# Patient Record
Sex: FEMALE | Race: BLACK OR AFRICAN AMERICAN
[De-identification: names, ages, dates, MRNs, and addresses within clinical notes are randomized per-mention and may not be internally consistent; named-entity substitution may affect disease eponyms.]

---

## 2017-02-23 NOTE — RAD
PA AND LATERAL VIEWS OF CHEST:

 

Date:

02/23/17 

 

HISTORY:  

Dyspnea, cough. 

 

FINDINGS:

 

Comparison made with exam of 09/25/16. 

 

The heart size is normal. The lungs are well expanded without focal areas of consolidation, pneumoth
orax, or pleural effusions. There are degenerative changes in the spine. 

 

IMPRESSION: 

No radiographic evidence of acute cardiopulmonary process. 

 

 

POS: [facility]

## 2017-02-28 NOTE — CT
ABDOMEN AND PELVIC CT SCAN WITH IV CONTRAST:

2/28/17

 

HISTORY: 

41-year-old female with cough and bodyaches. History of diverticulitis, patient did have some nausea
 and vomiting at the time of injection.

 

Postcontrast imaging of the abdomen and pelvis is performed. There is slight delay in the initiating
 the scan because of the patient's nausea and vomiting at the time of injection resulting in dense c
ontrast opacification of the renal collecting systems and ureters and bladder. 

 

The visualized liver, gallbladder, pancreas, spleen, adrenal glands are unremarkable. Nonobstructing
 renal calculi cannot be excluded because of the dense contrast. There is no evidence for obstructin
g  calculus. Normal appearing appendix. Unremarkable uterus and adnexa. No abscess or abnormal flu
id collection. 

 

IMPRESSION:  

Unremarkable abdomen and pelvic CT scan. 

 

POS: [facility]

## 2017-02-28 NOTE — RAD
PA AND LATERAL CHEST:

 

Comparison:  2-23-17

 

History:  Cough, body aches. 

 

FINDINGS: 

Heart size appears borderline to minimally enlarged.  The mediastinum appears unremarkable.  The michelle
gs are clear of infiltrates. 

 

IMPRESSION: 

Borderline heart size. 

 

POS: [facility]

## 2017-06-05 NOTE — RAD
TWO VIEWS OF THE CHEST:

 

COMPARISON: 

2/28/17.

 

HISTORY: 

Cough for 2 weeks.

 

FINDINGS:

Two views of the chest show normal sized cardiomediastinal silhouette. There is no evidence of conso
lidation, mass, or pleural effusion. Degenerative changes are seen in the spine.

 

IMPRESSION:

No evidence of acute cardiopulmonary disease.

 

POS: [facility]

## 2018-05-13 NOTE — CT
NONCONTRAST CT HEAD:

 

Date:  05/13/18 

 

HISTORY:  

Headache. 

 

COMPARISON:  

05/16/16. 

 

FINDINGS:

There is no evidence of a hemorrhage, acute infarction, mass effect, or midline shift. Ventricular sy
stem is stable in size and appearance compared to prior exam. There has been no interval change from 
prior study. 

 

IMPRESSION: 

No acute intracranial abnormality is demonstrated.  

 

POS: [facility]

## 2018-06-18 NOTE — RAD
PORTABLE AP CHEST X-RAY:

 

06/18/2018

 

HISTORY:

Difficulty breathing and chest tightness.  Symptoms started this morning and are intermittent.

 

COMPARISON:

09/26/2017

 

FINDINGS:

The cardiac silhouette and the bronchovascular markings are accentuated by the shallow depth of inspi
ration and the portable technique.  The lungs remain clear.  There has been no interval change from t
he prior exam.

 

IMPRESSION:

No acute cardiopulmonary process.

 

POS: [facility]

## 2018-07-02 NOTE — RAD
RADIOGRAPH OF CHEST FRONTAL VIEW:

 

Comparison: 6-18-18

 

Indication: Chest pain. 

 

FINDINGS: 

Cardiac silhouette is accentuated with portable technique. No lobar consolidation, effusion, or discr
ete pneumothorax. Overlying body wall soft tissues do limit sensitivity of the exam. 

 

IMPRESSION: 

No focal consolidation. 

 

POS: [facility]

## 2018-07-26 NOTE — RAD
CHEST TWO VIEW

7/26/18

 

HISTORY: 

Lupus, flu-like symptoms. 

 

COMPARISON:  

Radiographs 2017.

 

FINDINGS:  

The lungs are clear. No pneumothorax or effusion. The cardiac silhouette and mediastinal contours are
 within normal limits.

 

IMPRESSION:  

No acute intrathoracic abnormality. 

 

[facility]: [facility]

## 2018-07-30 NOTE — PDOC.PN
- Subjective


Encounter Start Date: 07/30/18


Encounter Start Time: 11:45


Subjective: nsg notes rev, nany ovn, did not sleep well ovn, still has pain in 

mouth


-: when trying to eat, overall still has fatigue, mm aches, joint aches and 


-: abd pain more prominent 





- Objective


Resuscitation Status: 


 











Resuscitation Status           FULL:Full Resuscitation














Vital Signs & Weight: 


 Vital Signs (12 hours)











  Temp Pulse Resp BP Pulse Ox


 


 07/30/18 08:40  97.8 F  57 L  20   96


 


 07/30/18 07:45  97.8 F  57 L  20  129/82  96











I&O: 


 











 07/29/18 07/30/18 07/31/18





 06:59 06:59 06:59


 


Intake Total  1550 


 


Balance  1550 











Result Diagrams: 


 07/30/18 05:27





 07/30/18 05:27


Additional Labs: 


 Accuchecks











  07/30/18 07/30/18 07/29/18





  10:43 05:37 19:59


 


POC Glucose  209 H  191 H  201 H














Phys Exam





- Physical Examination


Constitutional: NAD


lying in hospital bed


moist mm, poor dentition, ulcer on palate unchanged


Respiratory: no wheezing, no rales, no rhonchi, clear to auscultation bilateral


Cardiovascular: RRR, no significant murmur, no rub


soft heart tones


Gastrointestinal: soft, no distention, positive bowel sounds


ttp epigastrium and RLQ particularly


Musculoskeletal: pulses present


trace b/l pitting pedal edema


Neurological: moves all 4 limbs


Psychiatric: normal affect, A&O x 3





Dx/Plan





- Plan





1.  Patient does have a known history of lupus.  She is having signs and 

symptoms consistent with a potential lupus flare.  Continue solumedrol, 

symptomatic mgmt. Already on cellcept and plaquenil. Question of whether or not 

ther eis also GI involvement at this point in time. pending dsDNA Ab





progressive leukopenia


chronic steroid use + cellcept + plaquenil, pt with degree of immunosuppression 


empiric cipro and flagyl with most likely etiology abdominal 





2.  Insulin-dependent type 2 diabetes.  We will continue the patient on her 

home regimen with serial glucose checks and sliding scale insulin given the 

anticipated hyperglycemia secondary to IV steroid utilization.





3.  Hypertension, currently appears to be stable.patient without any listed 

home antihypertensives. will continue to monitor





4.  Diet as tolerated. IVF dec from 100cc/hr to 75 cc/hr 2/2 edema. Pt still 

having poor Po intake 2/2 oral ulcers


5.  Deep venous thrombosis prophylaxis with Lovenox.  Activity as tolerated.








Review of Systems





- Medications/Allergies


Allergies/Adverse Reactions: 


 Allergies











Allergy/AdvReac Type Severity Reaction Status Date / Time


 


tramadol Allergy   Verified 09/26/16 06:05











Medications: 


 Current Medications





Acetaminophen (Tylenol)  650 mg PO Q4H PRN


   PRN Reason: Headache/Fever or Mild Pain


   Last Admin: 07/30/18 10:53 Dose:  650 mg


Alogliptin Benzoate (Alogliptin)  25 mg PO DAILY [facility]


   Last Admin: 07/30/18 08:43 Dose:  25 mg


Bisacodyl (Dulcolax)  10 mg PO DAILYPRN PRN


   PRN Reason: Constipation


Al Hydroxide/Mg Hydroxide 60 ml/ Lidocaine HCl 30 ml/Diphenhydramine HCl 75 mg  

0 ml SSW PRN PRN


   PRN Reason: Mouth Irritation


   Last Admin: 07/30/18 06:18 Dose:  10 ml


Dextrose/Water (Dextrose 50%)  25 gm SLOW IVP PRN PRN


   PRN Reason: Hypoglycemia


Enoxaparin Sodium (Lovenox)  40 mg SC 0900 [facility]


   Last Admin: 07/30/18 08:43 Dose:  40 mg


Famotidine (Pepcid)  20 mg SLOW IVP Q12HR [facility]


   Last Admin: 07/30/18 06:19 Dose:  20 mg


Glipizide (Glucotrol)  5 mg PO BID [facility]


   Last Admin: 07/30/18 08:43 Dose:  5 mg


Glucagon (Glucagon)  1 mg IM PRN PRN


   PRN Reason: Hypoglycemia


Hydroxychloroquine Sulfate (Plaquenil)  200 mg PO DAILY [facility]


   Last Admin: 07/30/18 08:43 Dose:  200 mg


Dextrose/Water (D5w)  1,000 mls @ 0 mls/hr IV .Q0M PRN; As Directed


   PRN Reason: Hypoglycemia


Insulin Glargine 15 units/ (Miscellaneous Medication)  0.15 mls @ 0 mls/hr SC 

BID [facility]


   Last Admin: 07/30/18 08:43 Dose:  0.15 mls


Ciprofloxacin/Dextrose 400 mg/ (Device)  200 mls @ 200 mls/hr IVPB Q12HR [facility]


Metronidazole 500 mg/ Device  100 mls @ 100 mls/hr IVPB Q8HR [facility]


Sodium Chloride (Normal Saline 0.9%)  1,000 mls @ 75 mls/hr IV .[id number] [facility]


Insulin Human Lispro (Humalog)  0 units SC .MILD SLIDING SCALE PRN


   PRN Reason: Mild Correctional Scale


   Last Admin: 07/30/18 08:43 Dose:  2 unit


Methylprednisolone Sodium Succinate (Solu-Medrol)  40 mg IVP Q6HR [facility]


   Last Admin: 07/30/18 05:13 Dose:  40 mg


Mycophenolate Mofetil (Cellcept)  1,500 mg PO BID [facility]


   Last Admin: 07/30/18 09:38 Dose:  1,500 mg


Senna (Senokot)  2 tab PO HSPRN PRN


   PRN Reason: Constipation


Sodium Chloride (Flush - Normal Saline)  10 ml IVF Q12HR [facility]


   Last Admin: 07/30/18 08:47 Dose:  Not Given


Sodium Chloride (Flush - Normal Saline)  10 ml IVF PRN PRN


   PRN Reason: Saline Flush

## 2018-07-31 NOTE — RAD
ABDOMEN TWO VIEWS:

 

History: Abdominal pain. 

 

FINDINGS: 

Gas and stool are apparent throughout the colon and rectum. No differential airfluid levels or eviden
ce of free subdiaphragmatic gas. Mild dilated gas filled loops of small bowel are present within the 
mid and left abdomen. No radiopaque foreign bodies. Phleboliths project over the pelvis. Degenerative
 changes lower lumbar spine and hips. 

 

IMPRESSION: 

Nonspecific bowel gas pattern. 

 

POS: [facility]

## 2018-07-31 NOTE — PDOC.PN
- Subjective


Encounter Start Date: 07/31/18


Encounter Start Time: 09:40





Pt seen for followup re: lupus flare.  Complains of abdo pain, diffuse, mild.





- Objective


Resuscitation Status: 


 











Resuscitation Status           FULL:Full Resuscitation














MAR Reviewed: Yes


Vital Signs & Weight: 


 Vital Signs (12 hours)











  Temp Pulse Resp BP Pulse Ox


 


 07/31/18 11:57  98.0 F  57 L  18  133/84  97


 


 07/31/18 08:00  98.0 F  96  18   98


 


 07/31/18 07:53  98.0 F  96  18  132/83  98








 Weight











Admit Weight                   231 lb 7.766 oz


 


Weight                         231 lb 7.766 oz














I&O: 


 











 07/30/18 07/31/18 08/01/18





 06:59 06:59 06:59


 


Intake Total 1550 2260 


 


Balance 1550 2260 











Result Diagrams: 


 07/31/18 04:43





 07/31/18 04:43


Additional Labs: 


 Accuchecks











  07/31/18 07/31/18 07/30/18





  11:36 04:24 19:23


 


POC Glucose  132 H  181 H  115 H














  07/30/18





  17:07


 


POC Glucose  107








labs reviewed by me





Phys Exam





- Physical Examination


Morbid obesity


HEENT: moist MMs, sclera anicteric, 2+ tonsils


mucocutaneous lesions


Neck: no nodes, no JVD, supple, full ROM


Respiratory: no wheezing, no rales, no rhonchi, clear to auscultation bilateral


Cardiovascular: RRR, no rub


S1, S2


Gastrointestinal: soft, non-tender, no distention, positive bowel sounds


Neurological: moves all 4 limbs


Psychiatric: normal affect, A&O x 3


Deviation from normal: lesions over extremities





Dx/Plan


(1) SLE exacerbation


Code(s): M32.9 - SYSTEMIC LUPUS ERYTHEMATOSUS, UNSPECIFIED   Status: Acute   

Comment: continue IV steroids, cellcept and plaquenil.   





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


Code(s): E13.9 - OTHER SPECIFIED DIABETES MELLITUS WITHOUT COMPLICATIONS   

Status: Chronic   Comment: continue accuchecks, insulin sliding scale   





(3) Diastolic dysfunction


Code(s): I51.9 - HEART DISEASE, UNSPECIFIED   Status: Chronic   Comment: stable

   





(4) Obesity, Class III, BMI 40-49.9 (morbid obesity)


Code(s): E66.01 - MORBID (SEVERE) OBESITY DUE TO EXCESS CALORIES   Status: 

Chronic   Comment: for management as outpatient   





- Plan





* .








Review of Systems





- Review of Systems


Constitutional: weakness.  negative: fever, chills, sweats, malaise


Respiratory: negative: Cough, Shortness of Breath, SOB with Excertion, 

Pleuritic Pain, Wheezing


Cardiovascular: negative: chest pain, palpitations, orthopnea, paroxysmal 

nocturnal dyspnea, edema, light headedness, other


Gastrointestinal: Abdominal Pain.  negative: Nausea, Vomiting, Diarrhea, 

Constipation, Melena, Hematochezia


Genitourinary: negative: Dysuria, Frequency, Incontinence, Hematuria, Retention


Skin: Rash, Lesions.  negative: Tanvir, Bruising





- Medications/Allergies


Allergies/Adverse Reactions: 


 Allergies











Allergy/AdvReac Type Severity Reaction Status Date / Time


 


tramadol Allergy   Verified 09/26/16 06:05











Medications: 


 Current Medications





Acetaminophen (Tylenol)  650 mg PO Q4H PRN


   PRN Reason: Headache/Fever or Mild Pain


   Last Admin: 07/31/18 09:07 Dose:  650 mg


Alogliptin Benzoate (Alogliptin)  25 mg PO DAILY [facility]


   Last Admin: 07/31/18 09:07 Dose:  25 mg


Bisacodyl (Dulcolax)  10 mg PO DAILYPRN PRN


   PRN Reason: Constipation


Al Hydroxide/Mg Hydroxide 60 ml/ Lidocaine HCl 30 ml/Diphenhydramine HCl 75 mg  

0 ml SSW PRN PRN


   PRN Reason: Mouth Irritation


   Last Admin: 07/31/18 10:17 Dose:  10 ml


Dextrose/Water (Dextrose 50%)  25 gm SLOW IVP PRN PRN


   PRN Reason: Hypoglycemia


Enoxaparin Sodium (Lovenox)  40 mg SC 0900 [facility]


   Last Admin: 07/31/18 09:09 Dose:  40 mg


Famotidine (Pepcid)  20 mg SLOW IVP Q12HR [facility]


   Last Admin: 07/31/18 10:18 Dose:  20 mg


Glipizide (Glucotrol)  5 mg PO BID [facility]


   Last Admin: 07/31/18 09:07 Dose:  5 mg


Glucagon (Glucagon)  1 mg IM PRN PRN


   PRN Reason: Hypoglycemia


Hydroxychloroquine Sulfate (Plaquenil)  200 mg PO DAILY [facility]


   Last Admin: 07/31/18 09:07 Dose:  200 mg


Dextrose/Water (D5w)  1,000 mls @ 0 mls/hr IV .Q0M PRN; As Directed


   PRN Reason: Hypoglycemia


Insulin Glargine 15 units/ (Miscellaneous Medication)  0.15 mls @ 0 mls/hr SC 

BID [facility]


   Last Admin: 07/31/18 09:08 Dose:  0.15 mls


Ciprofloxacin/Dextrose 400 mg/ (Device)  200 mls @ 200 mls/hr IVPB 1200,2359 [facility]


   Last Admin: 07/30/18 23:52 Dose:  200 mls


Metronidazole 500 mg/ Device  100 mls @ 100 mls/hr IVPB Q8HR [facility]


   Last Admin: 07/31/18 04:57 Dose:  100 mls


Sodium Chloride (Normal Saline 0.9%)  1,000 mls @ 75 mls/hr IV .[id number] [facility]


   Last Admin: 07/31/18 01:00 Dose:  Not Given


Insulin Human Lispro (Humalog)  0 units SC .MILD SLIDING SCALE PRN


   PRN Reason: Mild Correctional Scale


   Last Admin: 07/30/18 12:05 Dose:  3 unit


Ketorolac Tromethamine (Toradol)  15 mg IVP Q6H PRN


   PRN Reason: Pain


   Stop: 08/05/18 10:39


Ketorolac Tromethamine (Toradol)  15 mg IVP ONE [facility]


   Stop: 08/05/18 10:46


Methylprednisolone Sodium Succinate (Solu-Medrol)  40 mg IVP Q6HR [facility]


   Last Admin: 07/31/18 04:59 Dose:  40 mg


Mycophenolate Mofetil (Cellcept)  1,500 mg PO BID [facility]


   Last Admin: 07/31/18 10:16 Dose:  1,500 mg


Senna (Senokot)  2 tab PO HSPRN PRN


   PRN Reason: Constipation


Sodium Chloride (Flush - Normal Saline)  10 ml IVF Q12HR [facility]


   Last Admin: 07/31/18 10:20 Dose:  Not Given


Sodium Chloride (Flush - Normal Saline)  10 ml IVF PRN PRN


   PRN Reason: Saline Flush

## 2018-08-01 NOTE — CT
CT ABDOMEN AND PELVIS WITH CONTRAST:

 

HISTORY:

Right lower quadrant pain.

 

COMPARISON:

CT abdomen and pelvis from 02/28/2017.

 

FINDINGS:

The lung bases are clear.  No pericardial effusion.

 

The liver, spleen, pancreas, and gallbladder are all unremarkable.  The appendix is visualized and is
 normal.

 

No dilated loops of large or small bowel.

 

A right adnexal cystic structure is present.  No hydronephrosis.  No abnormal renal enhancing mass.

 

The adrenal glands are unremarkable.  Mild degenerative changes of both acetabula.

 

IMPRESSION:

No acute inflammatory process in the abdomen or pelvis.

 

POS: [facility]

## 2018-08-01 NOTE — PDOC.PN
- Subjective


Encounter Start Date: 08/01/18


Encounter Start Time: 09:40





Pt seen for followup re: SLE flare.  Denies chest pain.  Has abdo pain.  No 

fevers or chills.





- Objective


Resuscitation Status: 


 











Resuscitation Status           FULL:Full Resuscitation














MAR Reviewed: Yes


Vital Signs & Weight: 


 Vital Signs (12 hours)











  Temp Pulse Resp BP Pulse Ox


 


 08/01/18 08:00  97.6 F  55 L  18   98


 


 08/01/18 07:27  97.6 F  55 L  18  160/81 H  98








 Weight











Admit Weight                   231 lb 7.766 oz


 


Weight                         231 lb 7.766 oz














I&O: 


 











 07/31/18 08/01/18 08/02/18





 06:59 06:59 06:59


 


Intake Total 2260  


 


Balance 2260  











Result Diagrams: 


 08/02/18 03:35





 08/02/18 03:35


Additional Labs: 


 Accuchecks











  08/01/18 08/01/18 08/01/18





  11:28 05:22 00:15


 


POC Glucose  116 H  165 H  165 H














  07/31/18 07/31/18





  20:43 17:15


 


POC Glucose  115 H  95








Labs reviewed by me





Phys Exam





- Physical Examination


Constitutional: NAD


HEENT: moist MMs, sclera anicteric, oral pharynx no lesions, 2+ tonsils


Neck: no nodes, no JVD, supple, full ROM


Respiratory: no wheezing, no rales, no rhonchi, clear to auscultation bilateral


Cardiovascular: RRR, no rub


S1, S2


Gastrointestinal: soft, no distention, positive bowel sounds


Mild diffuse tenderness, no guarding or rigidity


Neurological: moves all 4 limbs


Psychiatric: normal affect, A&O x 3


Deviation from normal: rash over extremities and scalp





Dx/Plan


(1) SLE exacerbation


Code(s): M32.9 - SYSTEMIC LUPUS ERYTHEMATOSUS, UNSPECIFIED   Status: Acute   

Comment: will continue IV steroids, cellcept and plaquenil.   





(2) Abdominal pain


Code(s): R10.9 - UNSPECIFIED ABDOMINAL PAIN   Status: Acute   Comment: check CT 

abdo/pelvis to r/o infection etc.   





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


Code(s): E13.9 - OTHER SPECIFIED DIABETES MELLITUS WITHOUT COMPLICATIONS   

Status: Chronic   Comment: reasonable control with accuchecks, insulin sliding 

scale   





(4) Diastolic dysfunction


Code(s): I51.9 - HEART DISEASE, UNSPECIFIED   Status: Chronic   Comment: stable

   





(5) Obesity, Class III, BMI 40-49.9 (morbid obesity)


Code(s): E66.01 - MORBID (SEVERE) OBESITY DUE TO EXCESS CALORIES   Status: 

Chronic   Comment: for management as outpatient   





- Plan





* .








Review of Systems





- Review of Systems


Constitutional: negative: fever, chills, sweats, weakness, malaise


Cardiovascular: negative: chest pain, palpitations, orthopnea, paroxysmal 

nocturnal dyspnea, edema, light headedness


Gastrointestinal: Abdominal Pain.  negative: Nausea, Vomiting, Diarrhea, 

Constipation, Melena, Hematochezia





- Medications/Allergies


Allergies/Adverse Reactions: 


 Allergies











Allergy/AdvReac Type Severity Reaction Status Date / Time


 


tramadol Allergy   Verified 09/26/16 06:05











Medications: 


 Current Medications





Acetaminophen (Tylenol)  650 mg PO Q4H PRN


   PRN Reason: Headache/Fever or Mild Pain


   Last Admin: 07/31/18 16:38 Dose:  650 mg


Alogliptin Benzoate (Alogliptin)  25 mg PO DAILY [facility]


   Last Admin: 08/01/18 08:00 Dose:  25 mg


Bisacodyl (Dulcolax)  10 mg PO DAILYPRN PRN


   PRN Reason: Constipation


Al Hydroxide/Mg Hydroxide 60 ml/ Lidocaine HCl 30 ml/Diphenhydramine HCl 75 mg  

0 ml SSW PRN PRN


   PRN Reason: Mouth Irritation


   Last Admin: 08/01/18 13:08 Dose:  10 ml


Dextrose/Water (Dextrose 50%)  25 gm SLOW IVP PRN PRN


   PRN Reason: Hypoglycemia


Enoxaparin Sodium (Lovenox)  40 mg SC 0900 [facility]


   Last Admin: 08/01/18 08:02 Dose:  40 mg


Famotidine (Pepcid)  20 mg PO BID [facility]


   Last Admin: 08/01/18 08:00 Dose:  20 mg


Glipizide (Glucotrol)  5 mg PO BID [facility]


   Last Admin: 08/01/18 08:01 Dose:  5 mg


Glucagon (Glucagon)  1 mg IM PRN PRN


   PRN Reason: Hypoglycemia


Hydroxychloroquine Sulfate (Plaquenil)  200 mg PO DAILY [facility]


   Last Admin: 08/01/18 08:00 Dose:  200 mg


Dextrose/Water (D5w)  1,000 mls @ 0 mls/hr IV .Q0M PRN; As Directed


   PRN Reason: Hypoglycemia


Insulin Glargine 15 units/ (Miscellaneous Medication)  0.15 mls @ 0 mls/hr SC 

BID [facility]


   Last Admin: 08/01/18 08:02 Dose:  0.15 mls


Sodium Chloride (Normal Saline 0.9%)  1,000 mls @ 75 mls/hr IV .[id number] [facility]


   Last Admin: 08/01/18 05:34 Dose:  1,000 mls


Metronidazole 500 mg/ Device  100 mls @ 100 mls/hr IVPB 0100,0900,1700 [facility]


   Last Admin: 08/01/18 08:02 Dose:  100 mls


Ciprofloxacin/Dextrose 400 mg/ (Device)  200 mls @ 200 mls/hr IVPB 0600,1800 [facility]


   Last Admin: 08/01/18 05:33 Dose:  200 mls


Insulin Human Lispro (Humalog)  0 units SC .MILD SLIDING SCALE PRN


   PRN Reason: Mild Correctional Scale


   Last Admin: 07/30/18 12:05 Dose:  3 unit


Ketorolac Tromethamine (Toradol)  15 mg IVP Q6H PRN


   PRN Reason: Pain


   Stop: 08/05/18 10:39


   Last Admin: 08/01/18 13:08 Dose:  15 mg


Ketorolac Tromethamine (Toradol)  15 mg IVP ONE [facility]


   Stop: 08/05/18 10:46


Methylprednisolone Sodium Succinate (Solu-Medrol)  40 mg IVP Q6HR [facility]


   Last Admin: 08/01/18 12:34 Dose:  40 mg


Mycophenolate Mofetil (Cellcept)  1,500 mg PO BID [facility]


   Last Admin: 08/01/18 09:56 Dose:  1,500 mg


Senna (Senokot)  2 tab PO HSPRN PRN


   PRN Reason: Constipation


Sodium Chloride (Flush - Normal Saline)  10 ml IVF Q12HR [facility]


   Last Admin: 08/01/18 08:03 Dose:  10 ml


Sodium Chloride (Flush - Normal Saline)  10 ml IVF PRN PRN


   PRN Reason: Saline Flush

## 2018-08-02 NOTE — PDOC.PN
- Subjective


Encounter Start Date: 08/02/18


Encounter Start Time: 10:20





Pt seen for followup re: SLE flare.  Denies chest pain.  Abdo pain still +





- Objective


Resuscitation Status: 


 











Resuscitation Status           FULL:Full Resuscitation














MAR Reviewed: Yes


Vital Signs & Weight: 


 Vital Signs (12 hours)











  Temp Pulse Resp BP Pulse Ox


 


 08/02/18 08:37  97.8 F  71  16  151/83 H  98


 


 08/02/18 08:06  97.8 F  71  18   98








 Weight











Admit Weight                   231 lb 7.766 oz


 


Weight                         231 lb 7.766 oz














I&O: 


 











 08/01/18 08/02/18 08/03/18





 06:59 06:59 06:59


 


Intake Total  2334 1187


 


Balance  2334 1187











Result Diagrams: 


 08/02/18 03:35





 08/02/18 03:35


Additional Labs: 


 Accuchecks











  08/02/18 08/02/18 08/02/18





  16:05 11:51 03:38


 


POC Glucose  123 H  132 H  159 H














  08/01/18 08/01/18





  19:35 16:52


 


POC Glucose  147 H  106








Labs reviewed by me





Phys Exam





- Physical Examination


Constitutional: NAD


HEENT: moist MMs, sclera anicteric, oral pharynx no lesions, 2+ tonsils


Neck: no nodes, no JVD, supple, full ROM


Respiratory: no wheezing, no rales, no rhonchi, clear to auscultation bilateral


Cardiovascular: RRR, no rub


S1, S2


Gastrointestinal: soft, no distention, positive bowel sounds


mild diffuse tenderness, no guarding or rigidity


Neurological: moves all 4 limbs


Psychiatric: normal affect, A&O x 3


Deviation from normal: rash on extremities and scalp





Dx/Plan


(1) SLE exacerbation


Code(s): M32.9 - SYSTEMIC LUPUS ERYTHEMATOSUS, UNSPECIFIED   Status: Acute   

Comment: on IV steroids, cellcept and plaquenil.   





(2) Abdominal pain


Code(s): R10.9 - UNSPECIFIED ABDOMINAL PAIN   Status: Acute   Comment: consult 

GI   





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


Code(s): E13.9 - OTHER SPECIFIED DIABETES MELLITUS WITHOUT COMPLICATIONS   

Status: Chronic   Comment: reasonable control, start diabetic diet   





(4) Diastolic dysfunction


Code(s): I51.9 - HEART DISEASE, UNSPECIFIED   Status: Chronic   Comment: stable

   





(5) Obesity, Class III, BMI 40-49.9 (morbid obesity)


Code(s): E66.01 - MORBID (SEVERE) OBESITY DUE TO EXCESS CALORIES   Status: 

Chronic   Comment: for management as outpatient   





- Plan





* .








Review of Systems





- Review of Systems


Constitutional: negative: fever, chills, sweats, weakness, malaise


Respiratory: negative: Cough, Shortness of Breath, SOB with Excertion, 

Pleuritic Pain, Wheezing


Cardiovascular: negative: chest pain, palpitations, orthopnea, paroxysmal 

nocturnal dyspnea, edema, light headedness


Gastrointestinal: Abdominal Pain.  negative: Nausea, Vomiting, Diarrhea, 

Constipation, Melena, Hematochezia


Genitourinary: negative: Dysuria, Frequency, Incontinence, Hematuria, Retention


Musculoskeletal: negative: Neck Pain, Shoulder Pain, Arm Pain, Back Pain, Hand 

Pain, Leg Pain, Foot Pain





- Medications/Allergies


Allergies/Adverse Reactions: 


 Allergies











Allergy/AdvReac Type Severity Reaction Status Date / Time


 


tramadol Allergy   Verified 09/26/16 06:05











Medications: 


 Current Medications





Acetaminophen (Tylenol)  650 mg PO Q4H PRN


   PRN Reason: Headache/Fever or Mild Pain


   Last Admin: 08/02/18 13:22 Dose:  650 mg


Alogliptin Benzoate (Alogliptin)  25 mg PO DAILY [facility]


   Last Admin: 08/02/18 08:06 Dose:  25 mg


Bisacodyl (Dulcolax)  10 mg PO DAILYPRN PRN


   PRN Reason: Constipation


Al Hydroxide/Mg Hydroxide 60 ml/ Lidocaine HCl 30 ml/Diphenhydramine HCl 75 mg  

0 ml SSW PRN PRN


   PRN Reason: Mouth Irritation


   Last Admin: 08/02/18 13:16 Dose:  10 ml


Dextrose/Water (Dextrose 50%)  25 gm SLOW IVP PRN PRN


   PRN Reason: Hypoglycemia


Enoxaparin Sodium (Lovenox)  40 mg SC 0900 [facility]


   Last Admin: 08/02/18 08:05 Dose:  40 mg


Famotidine (Pepcid)  20 mg PO BID [facility]


   Last Admin: 08/02/18 08:06 Dose:  20 mg


Glipizide (Glucotrol)  5 mg PO BID [facility]


   Last Admin: 08/02/18 08:06 Dose:  5 mg


Glucagon (Glucagon)  1 mg IM PRN PRN


   PRN Reason: Hypoglycemia


Hydroxychloroquine Sulfate (Plaquenil)  200 mg PO DAILY [facility]


   Last Admin: 08/02/18 08:06 Dose:  200 mg


Dextrose/Water (D5w)  1,000 mls @ 0 mls/hr IV .Q0M PRN; As Directed


   PRN Reason: Hypoglycemia


Insulin Glargine 15 units/ (Miscellaneous Medication)  0.15 mls @ 0 mls/hr SC 

BID [facility]


   Last Admin: 08/02/18 08:05 Dose:  0.15 mls


Sodium Chloride (Normal Saline 0.9%)  1,000 mls @ 75 mls/hr IV .[id number] [facility]


   Last Admin: 08/02/18 18:05 Dose:  1,000 mls


Metronidazole 500 mg/ Device  100 mls @ 100 mls/hr IVPB 0100,0900,1700 [facility]


   Last Admin: 08/02/18 16:48 Dose:  100 mls


Ciprofloxacin/Dextrose 400 mg/ (Device)  200 mls @ 200 mls/hr IVPB 0600,1800 [facility]


   Last Admin: 08/02/18 17:54 Dose:  200 mls


Insulin Human Lispro (Humalog)  0 units SC .MILD SLIDING SCALE PRN


   PRN Reason: Mild Correctional Scale


   Last Admin: 08/01/18 19:50 Dose:  4 unit


Ketorolac Tromethamine (Toradol)  15 mg IVP Q6H PRN


   PRN Reason: Pain


   Stop: 08/05/18 10:39


   Last Admin: 08/02/18 08:06 Dose:  15 mg


Ketorolac Tromethamine (Toradol)  15 mg IVP ONE [facility]


   Stop: 08/05/18 10:46


Methylprednisolone Sodium Succinate (Solu-Medrol)  40 mg IVP Q6HR [facility]


   Last Admin: 08/02/18 17:54 Dose:  40 mg


Mycophenolate Mofetil (Cellcept)  1,500 mg PO BID [facility]


   Last Admin: 08/02/18 09:22 Dose:  1,500 mg


Senna (Senokot)  2 tab PO HSPRN PRN


   PRN Reason: Constipation


Sodium Chloride (Flush - Normal Saline)  10 ml IVF Q12HR [facility]


   Last Admin: 08/02/18 08:06 Dose:  10 ml


Sodium Chloride (Flush - Normal Saline)  10 ml IVF PRN PRN


   PRN Reason: Saline Flush

## 2018-08-03 NOTE — CON
DATE OF CONSULTATION:  2018

 

REFERRING PHYSICIAN:  Alberto Ramirez MD

 

REASON FOR CONSULTATION:  Abdominal pain.

 

HISTORY OF PRESENT ILLNESS:  Ms. Mari Méndez is a very pleasant 42-year-old -American fem
bj admitted to the hospital with a history of lupus flare.  She has been diagnosed with lupus around
 .  She had seen a rheumatologist in [place], [place] in 2016 and was placed on Plaquenil, mycop
henolate, and also prednisone.  The patient's insurance ran out and she could not go back to see Dr. Kim afterwards.  The patient has been taking medicine on a regular basis.  She states her first c
ousin has lupus and she has been taking some medicine from her first cousin and she is not taking hig
her dose.  She is still taking Plaquenil off and on and also prednisone on regular basis.  The patien
t came to the ER with some generalized body pains, arthralgias, and myalgias.  She actually appears v
mirela comfortable at the present time.  She says she is having abdominal pain over the last 3-4 weeks. 
 The pain is very vague and really not suggestive of any specific pathology.  The pain actually start
ed in the right upper quadrant and goes around to the epigastric area and also to the left side of ab
domen.  She also feels more pain when she lies on one side.  There is no nausea, no vomiting.  She ha
d a CAT scan of abdomen done, which revealed no pathology.  After CAT scan, she had multiple loose st
ools.  The patient's bowel movements are fairly regular; however, over the last few weeks, she has be
en having more difficulty going to the bathroom.  No rectal bleeding, no melena.  She is tolerating h
er diet without any nausea, vomiting, and the pain is not worse after eating.  She had no similar gracie
n episode in the past.  She is worried about her kidney because of the lupus.  She thinks the kidney 
is causing her pain.  However, her pain is really not in one location, tends to move around from one 
area to another area.  She has no dysuria, hematuria, or frequent urination.

 

ALLERGIES:  TRAMADOL.

 

SOCIAL HISTORY:  The patient does not smoke or drink alcohol.

 

MEDICAL ILLNESSES:

1.  Obesity.

2.  Hypertension.

3.  Type 2 diabetes mellitus diagnosed 2 years ago.  She has seen rheumatologist in [place] in 201
6, but she has not gone back to him.

 

SURGERIES:   x2.

 

HOME MEDICATIONS:  Plaquenil 200 mg p.o. once a day, insulin detemir 50 units subcutaneous b.i.d., gl
ipizide, mycophenolate, prednisone 10 mg p.o. twice a day.

 

FAMILY HISTORY:  Heart disease.

 

MENSTRUAL HISTORY:  She had no periods for a while now.  Now, she is menstruating again.  LMP was  and she was having heavy bleeding with large amount of blood clots.

 

REVIEW OF SYSTEMS:  Constitutional:  No fever, no weight loss, but does complain of generalized body 
pain.  Respiratory:  No history of chronic cough, hemoptysis, dyspnea.  Cardiovascular:  No chest gracie
n, no palpitation, no real dyspnea, orthopnea, or PND.  Gastrointestinal:  Migrating pain, not in one
 location and pain is really not worse with meals.  She has no nausea, no vomiting.  Genitourinary:  
No dysuria, hematuria, or frequency of urination.  Musculoskeletal:  Generalized body pain, arthralgi
as, myalgias.  Endocrine:  Not relevant.

 

PHYSICAL EXAMINATION:

GENERAL:  The patient appears very comfortable.  She is awake, alert, and communicative.  She is in n
o distress.  She is obese.

VITAL SIGNS:  Temperature 97.8 degrees Fahrenheit, pulse 71, blood pressure 151/83.

HEENT:  Conjunctivae clear.

NECK:  Supple.  No adenitis or thyromegaly noted.  I could not see any visible rash.

CARDIOVASCULAR:  First and second heart sounds normal.

LUNGS:  Clear to auscultation.

ABDOMEN:  Soft to palpate.  Abdomen exam is actually very benign.  She is mildly tender, but no local
izing symptoms.  She has _____ all over the abdomen.  Overall, the exam is very benign.

EXTREMITIES:  No edema.

 

LABORATORY DATA:  CBC:  WBC 5800, hemoglobin 10.5, hematocrit 30.3, MCV 77.7, platelet count 157,000,
 polymorphs 82, lymphocytes 13, monocytes 4.  Serum chemistries:  Sodium 140, potassium 3.7, chloride
 111, bicarbonate 18, BUN is 11, creatinine 0.2, glucose is 160, calcium 8.5.  Her serology shows hep
atitis markers are negative.

 

An abdominal CAT scan was done, showed no acute pathology.

 

CLINICAL IMPRESSION:

1.  A 42-year-old -American female with abdominal pain, which is very poorly localized and see
ms to be migrating.  Abdominal exam is very benign.  She had no nausea, no vomiting.  Eating does not
 make the pain worse.  Abdominal CAT scan is negative.  It is possible that the patient _____ due to 
underlying lupus, but the overall exam is very benign.  Again, the symptoms are very poorly localized
.

2.  Obesity.

3.  Diabetes mellitus.

4.  Hypertension.

5.  History of lupus erythematous.

 

RECOMMENDATION:  From GI standpoint, no further workup is necessary.  I will try to obtain medical re
cords from Dr. Kim from [place] and see how better lupus was.  She probably needs to taper the
 prednisone and she is taking prednisone for the last 2 years.  Anyway, I think she should probably f
ollow up with the rheumatologist as far the lupus is concerned.

## 2018-08-03 NOTE — PDOC.PN
- Subjective


Encounter Start Date: 08/03/18


Encounter Start Time: 10:00





Pt seen for followup re: SLE flare.  Feels better.  No nausea or vomiting.  No 

fevers or chills.





- Objective


Resuscitation Status: 


 











Resuscitation Status           FULL:Full Resuscitation














Vital Signs & Weight: 


 Vital Signs (12 hours)











  Temp Pulse Resp BP Pulse Ox


 


 08/03/18 08:00  97.9 F  58 L  16   97


 


 08/03/18 07:47  97.9 F  58 L  16  163/89 H  97








 Weight











Admit Weight                   231 lb 7.766 oz


 


Weight                         231 lb 7.766 oz














I&O: 


 











 08/02/18 08/03/18 08/04/18





 06:59 06:59 06:59


 


Intake Total 2334 2012 


 


Balance 2334 2012 











Result Diagrams: 


 08/02/18 03:35





 08/02/18 03:35


Additional Labs: 


 Accuchecks











  08/03/18 08/03/18 08/03/18





  16:36 11:16 04:00


 


POC Glucose  124 H  143 H  113 H














  08/02/18





  19:17


 


POC Glucose  138 H














Phys Exam





- Physical Examination


Morbid obesity


HEENT: moist MMs, sclera anicteric, oral pharynx no lesions, 2+ tonsils


Neck: no nodes, no JVD, supple, full ROM


Respiratory: no wheezing, no rales, no rhonchi, clear to auscultation bilateral


Cardiovascular: RRR, no rub


S1, S2


Gastrointestinal: soft, non-tender, no distention, positive bowel sounds


Neurological: moves all 4 limbs


Psychiatric: normal affect, A&O x 3


Deviation from normal: skin and scalp lesions improving





Dx/Plan


(1) SLE exacerbation


Code(s): M32.9 - SYSTEMIC LUPUS ERYTHEMATOSUS, UNSPECIFIED   Status: Acute   

Comment: continue cellcept and plaquenil.  Change steroids to oral.   





(2) Abdominal pain


Code(s): R10.9 - UNSPECIFIED ABDOMINAL PAIN   Status: Acute   Comment: 

appreciate GI service input   





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


Code(s): E13.9 - OTHER SPECIFIED DIABETES MELLITUS WITHOUT COMPLICATIONS   

Status: Chronic   Comment: Pt does not want to have diabetic diet, explained 

reason, she wants regular diet   





(4) Diastolic dysfunction


Code(s): I51.9 - HEART DISEASE, UNSPECIFIED   Status: Chronic   Comment: stable

   





(5) Obesity, Class III, BMI 40-49.9 (morbid obesity)


Code(s): E66.01 - MORBID (SEVERE) OBESITY DUE TO EXCESS CALORIES   Status: 

Chronic   Comment: for management as outpatient   





- Plan





* .








Review of Systems





- Review of Systems


Constitutional: negative: fever, chills, sweats, weakness, malaise


Cardiovascular: negative: chest pain, palpitations, orthopnea, paroxysmal 

nocturnal dyspnea, edema, light headedness


Gastrointestinal: Abdominal Pain.  negative: Nausea, Vomiting, Diarrhea, 

Constipation, Melena, Hematochezia


Musculoskeletal: negative: Neck Pain, Shoulder Pain, Arm Pain, Back Pain, Hand 

Pain, Leg Pain, Foot Pain


Skin: Rash, Lesions


Neurological: negative: Weakness, Numbness, Incoordination, Change in Speech, 

Confusion, Seizures





- Medications/Allergies


Allergies/Adverse Reactions: 


 Allergies











Allergy/AdvReac Type Severity Reaction Status Date / Time


 


tramadol Allergy   Verified 09/26/16 06:05











Medications: 


 Current Medications





Acetaminophen (Tylenol)  650 mg PO Q4H PRN


   PRN Reason: Headache/Fever or Mild Pain


   Last Admin: 08/03/18 04:01 Dose:  650 mg


Alogliptin Benzoate (Alogliptin)  25 mg PO DAILY [facility]


   Last Admin: 08/03/18 08:47 Dose:  25 mg


Bisacodyl (Dulcolax)  10 mg PO DAILYPRN PRN


   PRN Reason: Constipation


Ciprofloxacin (Cipro)  500 mg PO 0600,2000 [facility]


Ciprofloxacin (Cipro)  500 mg PO 1800 [facility]


   Stop: 08/03/18 20:00


   Last Admin: 08/03/18 17:49 Dose:  500 mg


Al Hydroxide/Mg Hydroxide 60 ml/ Lidocaine HCl 30 ml/Diphenhydramine HCl 75 mg  

0 ml SSW PRN PRN


   PRN Reason: Mouth Irritation


   Last Admin: 08/03/18 08:45 Dose:  10 ml


Dextrose/Water (Dextrose 50%)  25 gm SLOW IVP PRN PRN


   PRN Reason: Hypoglycemia


Enoxaparin Sodium (Lovenox)  40 mg SC 0900 [facility]


   Last Admin: 08/03/18 08:46 Dose:  40 mg


Famotidine (Pepcid)  20 mg PO BID [facility]


   Last Admin: 08/03/18 08:47 Dose:  20 mg


Glipizide (Glucotrol)  5 mg PO BID [facility]


   Last Admin: 08/03/18 08:47 Dose:  5 mg


Glucagon (Glucagon)  1 mg IM PRN PRN


   PRN Reason: Hypoglycemia


Hydroxychloroquine Sulfate (Plaquenil)  200 mg PO DAILY [facility]


   Last Admin: 08/03/18 08:46 Dose:  200 mg


Dextrose/Water (D5w)  1,000 mls @ 0 mls/hr IV .Q0M PRN; As Directed


   PRN Reason: Hypoglycemia


Insulin Glargine 15 units/ (Miscellaneous Medication)  0.15 mls @ 0 mls/hr SC 

BID [facility]


   Last Admin: 08/03/18 08:47 Dose:  0.15 mls


Insulin Human Lispro (Humalog)  0 units SC .MILD SLIDING SCALE PRN


   PRN Reason: Mild Correctional Scale


   Last Admin: 08/01/18 19:50 Dose:  4 unit


Ketorolac Tromethamine (Toradol)  10 mg PO Q8HR PRN


   PRN Reason: Pain


   Stop: 08/08/18 22:01


Metronidazole (Flagyl)  500 mg PO TID [facility]


Metronidazole (Flagyl)  500 mg PO NOW [facility]


   Stop: 08/03/18 19:30


   Last Admin: 08/03/18 17:49 Dose:  500 mg


Mycophenolate Mofetil (Cellcept)  1,500 mg PO BID [facility]


   Last Admin: 08/03/18 12:00 Dose:  1,500 mg


Prednisone (Prednisone)  50 [facility]


Senna (Senokot)  2 tab PO HSPRN PRN


   PRN Reason: Constipation


Sodium Chloride (Flush - Normal Saline)  10 ml IVF Q12HR [facility]


   Last Admin: 08/03/18 08:51 Dose:  Not Given


Sodium Chloride (Flush - Normal Saline)  10 ml IVF PRN PRN


   PRN Reason: Saline Flush

## 2018-08-05 NOTE — DIS
DISCHARGE DIAGNOSES:

1.  Systemic lupus flare.

2.  Insulin-dependent type 2 diabetes mellitus.

3.  Hypertension.

 

HISTORY OF PRESENT ILLNESS:  This is a 42-year-old female with known history of lupus, type 2 insulin
-dependent diabetes mellitus, hypertension who presented with chief complaint of lupus flare.  Patien
t stated that she was having pain, painful skin lesions and also having some oral lesions and low-gra
de fever with some generalized weakness and aches.

 

HOSPITAL COURSE:  The patient was then admitted for a lupus flare.  Patient was put on  IV Solu-Medro
l and ESR, CRP, complement C3-C4, double stranded DNA were checked.  Patient was also put on a regime
n for insulin-dependent type 2 diabetes.

 

LABORATORY DATA:  Patient's hemoglobin initially was 2.0 and trended up to 5.8, hemoglobin was 10.4 a
nd was stable around 10.  Platelets were 135 and climbed up to 192.  Chemistry:  The patient's sodium
 was 139, potassium 3.1, we treated the potassium.  We also instructed the patient to take bananas wh
en patient goes home.

 

DISCHARGE MEDICATIONS:  Patient's discharge medications are on patient's electronic medical records. 
 Kindly refer to patient's electronic medical records.

 

DISPOSITION:  Patient was discharged in safe and stable condition.  Patient did not have any distress
.  Patient was advised to follow up with her primary care doctor.  Patient was also advised to follow
 up with cardiologist.  We advised the patient to also get ophthalmology checked every year since the
 patient was taking Plaquenil.  [employer] coupons were given to the patient for the patient to go to the
 pharmacy and buy her medications since the patient does not have insurance.

 

When I saw the patient, the patient was sitting in bed with her baby on her lap, the patient does not
 seem to be in any acute distress.  The patient is stable for discharge.

 

Vitals were stable.

 

Labs were reviewed and patient had hypokalemia which we advised her to take potassium.  Patient state
d that she cannot be able to take vitamin C, because she has diabetes mellitus type 2 which we agreed
.  We told patient to just continually take bananas, which would replete her potassium.  Patient was 
also prescribed with iron b.i.d. with vitamin C to help her with her history of anemia.

 

This dictation was dictated by Dr. Adis Marmolejo on patient, Mari Méndez.

## 2019-02-22 NOTE — RAD
CHEST TWO VIEWS:

2/22/19

 

COMPARISON:  

7/26/18.

 

HISTORY: 

Pain. Shortness of breath. 

 

FINDINGS:  

Normal cardiac silhouette. Pulmonary vessels and hilum are normal. Costophrenic angles are clear. No 
consolidation or mass. No pneumothorax or osseous abnormalities. 

 

IMPRESSION:  

No acute cardiopulmonary process. 

 

[place]: [facility] [place]

## 2019-02-23 NOTE — NM
NUCLEAR MEDICINE CARDIAC STRESS TEST WITH EJECTION FRACTION:

2/23/19

 

HISTORY: 

Chest pain, hypertension, diabetes, and dyslipidemia. 

 

COMPARISON:  

Study from 2015.

 

TECHNIQUE:  

Stress only was performed of the intravenous administration of 31 millicuries technetium 99m [place]
i. 

 

No evidence of scar or ischemia. Normal wall motion. Ejection fraction of 68%. 

 

IMPRESSION:  

Normal exam. 

 

POS: [facility]

## 2019-02-23 NOTE — PDOC.PN
- Subjective


Encounter Start Date: 02/23/19


Encounter Start Time: 10:00


-: old records requested/rev





Patient seen and examined. No new complaints. No overnight events





- Objective


Resuscitation Status - Order Detail:





02/22/19 23:35


Resuscitation Status Routine 


   Co-Sign Provider: 


   Resuscitation Status: FULL: Full Resuscitation








MAR Reviewed: Yes


Vital Signs & Weight: 


 Vital Signs (12 hours)











  Temp Pulse Resp BP BP Pulse Ox


 


 02/23/19 09:20   99   129/79  


 


 02/23/19 07:30  99.5 F  104 H  20   126/78  97


 


 02/23/19 03:38  98.6 F  101 H  20   106/64  95


 


 02/22/19 23:35  98.1 F  95  20   150/82 H  100








 Weight











Weight                         265 lb 6.4 oz














I&O: 


 











 02/22/19 02/23/19 02/24/19





 06:59 06:59 06:59


 


Intake Total  250 


 


Balance  250 











Result Diagrams: 


 02/23/19 04:26





 02/23/19 04:26


Additional Labs: 


 Accuchecks











  02/23/19





  08:19


 


POC Glucose  118 H











EKG Reviewed by me: Yes (nsr)





Phys Exam





- Physical Examination


Constitutional: NAD


HEENT: PERRLA, moist MMs, sclera anicteric


Neck: no JVD, supple


Respiratory: no wheezing, no rales, no rhonchi


Cardiovascular: RRR, no significant murmur, no rub


Gastrointestinal: soft, non-tender, no distention, positive bowel sounds


Musculoskeletal: no edema, pulses present


Neurological: non-focal, normal sensation, moves all 4 limbs


Lymphatic: no nodes


Psychiatric: normal affect, A&O x 3


Skin: no rash, normal turgor





Dx/Plan


(1) Chest pain


Code(s): R07.9 - CHEST PAIN, UNSPECIFIED   Status: Acute   





(2) Hypokalemia


Code(s): E87.6 - HYPOKALEMIA   Status: Acute   





(3) Diabetes type 2, controlled


Code(s): E11.9 - TYPE 2 DIABETES MELLITUS WITHOUT COMPLICATIONS   Status: 

Chronic   





(4) Diastolic dysfunction


Code(s): I51.9 - HEART DISEASE, UNSPECIFIED   Status: Chronic   Comment: stable

   





(5) Hypertension


Code(s): I10 - ESSENTIAL (PRIMARY) HYPERTENSION   Status: Chronic   





(6) Lupus (systemic lupus erythematosus)


Code(s): M32.9 - SYSTEMIC LUPUS ERYTHEMATOSUS, UNSPECIFIED   Status: Chronic   





(7) Normochromic normocytic anemia


Code(s): D64.9 - ANEMIA, UNSPECIFIED   Status: Chronic   





(8) Obesity, Class III, BMI 40-49.9 (morbid obesity)


Code(s): E66.01 - MORBID (SEVERE) OBESITY DUE TO EXCESS CALORIES   Status: 

Chronic   Comment:     





- Plan


cont current plan of care





* medication reviewed as below


* symptomatic treatment


* today echo and stress test


* discharge based on that results.








Review of Systems





- Review of Systems


ENT: negative: Ear Pain, Ear Discharge, Nose Pain, Nose Discharge, Nose 

Congestion, Mouth Pain, Mouth Swelling, Throat Pain, Throat Swelling, Other


Respiratory: negative: Cough, Dry, Shortness of Breath, Hemoptysis, SOB with 

Excertion, Pleuritic Pain, Sputum, Wheezing


Cardiovascular: negative: chest pain, palpitations, orthopnea, paroxysmal 

nocturnal dyspnea, edema, light headedness, other


Gastrointestinal: negative: Nausea, Vomiting, Abdominal Pain, Diarrhea, 

Constipation, Melena, Hematochezia, Other


Genitourinary: negative: Dysuria, Frequency, Incontinence, Hematuria, Retention

, Other


Musculoskeletal: negative: Neck Pain, Shoulder Pain, Arm Pain, Back Pain, Hand 

Pain, Leg Pain, Foot Pain, Other





- Medications/Allergies


Allergies/Adverse Reactions: 


 Allergies











Allergy/AdvReac Type Severity Reaction Status Date / Time


 


tramadol Allergy  Hives Verified 02/23/19 01:09











Medications: 


 Current Medications





Acetaminophen (Tylenol)  650 mg PO Q4H PRN


   PRN Reason: Headache/Fever/Mild Pain (1-3)


   Last Admin: 02/23/19 09:19 Dose:  650 mg


Enoxaparin Sodium (Lovenox)  40 mg SC 0900 [facility]


   Last Admin: 02/23/19 09:18 Dose:  40 mg


Famotidine (Pepcid)  20 mg SLOW IVP Q12HR [facility]


   Last Admin: 02/23/19 09:18 Dose:  20 mg


Hydroxychloroquine Sulfate (Plaquenil)  200 mg PO DAILY [facility]


   Last Admin: 02/23/19 09:19 Dose:  200 mg


Insulin Glargine 5 units/ (Miscellaneous Medication)  0.05 mls @ 0 mls/hr SC 

BID [facility]


   Last Admin: 02/23/19 09:19 Dose:  0.05 mls


Lisinopril (Zestril)  5 mg PO DAILY [facility]


   Last Admin: 02/23/19 09:20 Dose:  5 mg


Mycophenolate Mofetil (Cellcept)  1,500 mg PO BID [facility]


   Last Admin: 02/23/19 09:20 Dose:  1,500 mg


Ondansetron HCl (Zofran Odt)  4 mg PO Q6H PRN


   PRN Reason: Nausea/Vomiting


Ondansetron HCl (Zofran)  4 mg IVP Q6H PRN


   PRN Reason: Nausea/Vomiting


   Last Admin: 02/23/19 06:06 Dose:  4 mg


Prednisone (Prednisone)  10 mg PO DAILY [facility]


   Last Admin: 02/23/19 09:19 Dose:  10 mg


Sodium Chloride (Flush - Normal Saline)  10 ml IVF Q12HR [facility]


   Last Admin: 02/23/19 09:18 Dose:  10 ml


Sodium Chloride (Flush - Normal Saline)  10 ml IVF PRN PRN


   PRN Reason: Saline Flush

## 2019-02-23 NOTE — HP
PRIMARY CARE PHYSICIAN:  Óscar Blackman.



CHIEF COMPLAINT:  Chest pain and shortness of breath.



HISTORY OF PRESENT ILLNESS:  Ms. Huynh is a pleasant 43-year-old female with past

medical history of lupus, hypertension, hyperlipidemia, and diabetes mellitus, who

had presented to [facility] with left-sided chest pain

that had radiating to her back late last night.  She states that along with her

pain, she has been noticing some worsening shortness of breath as well.  She had

described her pain as a chest heaviness, she was recently seen in the emergency

department on 2019, for nasal congestion and cough.  She was prescribed

Augmentin 875 mg 1 capsule twice daily and Tessalon Perles for cough.  She was

recommended to follow up with her primary care doctor.  She states that she has been

taking this medication as directed.  However, she was not able to follow up with her

family doctor.  She states that her sinus congestion had improved, however, noticed

this chest pain developed last night.  During her initial workup, cardiac enzymes

were found to be negative less than 0.010.  Potassium low at 2.8.  Therefore, she

was given 40 mEq oral potassium chloride in the emergency department.  Hemoglobin

found to be 11.4, which seems to be her baseline.  She had denied any fever, chills,

any abdominal pain, any change in stool.  She states that the chest pain made her

nauseous and vomit x2, however, has not vomited since last night.  She was given IV

morphine along with IV Zofran in the emergency department, she states IV morphine

did not help with her pain.  D-dimer was found to be normal at 0.42, and chest x-ray

was obtained, however showed no acute cardiopulmonary process.  It was determined

that the patient to be brought under observation and be placed on telemetry.  She

will also undergo workup for chest pain rule out. 



REVIEW OF SYSTEMS:  All other systems reviewed and found to be negative unless

mentioned in the HPI. 



PAST MEDICAL HISTORY:  Hypertension, hyperlipidemia, systemic lupus, diabetes

mellitus type 2. 



PAST SURGICAL HISTORY:   section x2.



PSYCHIATRIC HISTORY:  None.



SOCIAL HISTORY:  Denies tobacco, alcohol, or illicit drug use.



ALLERGIES:  TRAMADOL.



HOME MEDICATIONS:  

1. Mycophenolate 1500 mg oral twice daily.

2. Levemir 10 units subcutaneous twice daily.

3. Plaquenil 200 mg oral once daily.

4. Lisinopril/hydrochlorothiazide 10/12.5 mg one tablet oral once daily.

5. Prednisone 10 mg twice daily.



PHYSICAL EXAMINATION:

VITAL SIGNS:  Blood pressure 124/79, pulse 87, respirations 18, temperature 98.5

degrees Fahrenheit, O2 saturations 100% on room air. 

GENERAL:  The patient is awake, alert, and oriented x3, mild acute distress noted

due to pain.  The patient appears morbidly obese.  Several family members at

bedside. 

HEENT:  Atraumatic, normocephalic.  Pupils are round and reactive to light.

Extraocular muscles intact.  Moist mucous membranes noted. 

NECK:  Soft and supple.  No JVD noted. 

CARDIOVASCULAR:  Positive S1 and S2.  Regular rate and rhythm.  No murmurs

auscultated. 

RESPIRATORY:  Clear to auscultation bilaterally.  No wheezes, rales, or rhonchi. 

ABDOMEN:  Obese, soft, and nontender.  Bowel sounds present.  No rebound.  No

rigidity. 

MUSCULOSKELETAL:  Strength 5+ bilaterally upper and lower extremities.  Moves all

extremities equal.  1+ nonpitting edema in lower extremities. 

NEUROLOGIC:  Cranial nerves II through XII grossly intact.  No focal deficits noted.

 Speech normal and intact.  Gait not assessed. 

PSYCHIATRIC:  Good mood and affect.



LABORATORY DATA:  WBC 6.7, RBC 4.46, hemoglobin 11.4, platelets 235.  D-dimer 0.42.

Sodium 140, potassium 2.8, anion gap 12, BUN 15, creatinine 0.92, estimated GFR 80.

Troponin less than 0.010.  Urinalysis unremarkable.  Urine pregnancy negative. 



DIAGNOSTIC IMAGING:  Chest x-ray showed no acute cardiopulmonary process.



ASSESSMENT AND PLAN:  

1. Chest pain.  We will rule out cardiac etiology, trend troponins, stress test and

echocardiogram ordered for the morning.  Place the patient on telemetry and observe

overnight. 

2. Hypertension.  Continue on the patient's home regimen.

3. Diabetes mellitus.  Continue on the patient's home regimen.  Place on insulin

sliding scale with Accu-Cheks. 

4. Hypokalemia at 2.8, the patient received 40 mEq of potassium chloride in the

emergency department.  Recheck BMP in the morning. 

5. History of lupus, currently stable.  Continue on the patient's home regimen.

6. Morbid obesity, stable.

7. Deep venous thrombosis and gastrointestinal prophylaxis.

8. Code status, full code.



DISPOSITION:  Pending further workup and clinical findings.







Job ID:  [id number]

## 2019-02-24 NOTE — DIS
DATE OF ADMISSION:  02/23/2019



DATE OF DISCHARGE:  02/24/2019



PRIMARY CARE PHYSICIAN:  [facility].



DISCHARGE DISPOSITION:  Home.



PRIMARY DISCHARGE DIAGNOSES:  

1. Chest pain, ruled out acute coronary syndrome.

2. Hypokalemia, corrected.



SECONDARY DISCHARGE DIAGNOSES:  

1. Morbid obesity with BMI of 45.

2. Normocytic normochromic anemia.

3. Systemic lupus erythematosus.

4. Hypertension.

5. Diastolic dysfunction.

6. Diabetes, type 2.



PRIMARY PROCEDURES/OPERATION:  None.



RADIOLOGICAL INVESTIGATION:  Chest x-ray, normal.  Stress test, negative for any

ischemia.  Echocardiography showed normal EF with diastolic dysfunction and LVH. 



SIGNIFICANT LABORATORY DATA:  WBC 5.5, hemoglobin 10.5, and platelet 189.  D-dimer

0.42.  Sodium 138, potassium 3.1, creatinine 0.82, and LDL 61.  Urinalysis normal. 



DISCHARGE MEDICATIONS:  

1. Tradjenta 5 mg p.o. daily.

2. Lisinopril 5 mg p.o. daily.

3. CellCept 1500 mg p.o. b.i.d.

4. Prednisone 10 mg daily.

5. Plaquenil 200 mg daily.

6. Levemir insulin 15 units subcu b.i.d.



CONTRAINDICATION:  None.



CODE STATUS:  Full code.



INPATIENT CONSULTANT:  None.



ALLERGIES:  TRAMADOL.



DISCHARGE PLAN:  Posthospital, the patient was instructed to follow up with primary

care physician in 1 week. 



HOSPITAL COURSE:  A 43-year-old female with above-mentioned medical problem, who was

admitted by nurse practitioner, Jalil Gross.  Please see his H and P for further

details.  The patient was having chest pain.  Her chest pain description was

atypical.  Her D-dimer was negative.  Her cardiac enzymes were negative.  Her chest

x-ray was normal.  Echocardiography was obtained, which showed diastolic dysfunction

and LVH.  The patient also underwent stress test which came back completely normal. 



The patient was seen and examined at bedside today.  She does not have any more

chest pain. 



REVIEW OF SYSTEMS:  All review of systems was reviewed with her and negative. 



Her telemetry remained unremarkable.



PHYSICAL EXAMINATION:

VITAL SIGNS:  Today, temperature 98.4, pulse 84, respiratory rate 20, saturation 97%

on room air, and blood pressure 108/67.  Weight 265 pounds. 

GENERAL:  The patient is currently alert, oriented, no acute distress. 

HEENT:  Head; normocephalic, atraumatic.  Eyes; pupils round, reactive to light.

Extraocular muscle intact.  ENT, oropharynx within normal limits.  Moist mucous

membranes.  No oral lesion.  No pharyngeal erythema.  No exudate. 

NECK:  Supple.  No JVD.  No thyromegaly.  No carotid bruit. 

LUNGS:  Clear to auscultation without any rhonchi or rales. 

CARDIAC:  S1, S2.  Regular without any murmur. 

ABDOMEN:  Soft and benign. 

EXTREMITIES:  No edema. 

NEUROLOGIC:  Nonfocal examination. 



The patient is medically stable for discharge today.  She will continue all her

previous medication. 







Job ID:  [id number]

## 2021-03-11 ENCOUNTER — HOSPITAL ENCOUNTER (EMERGENCY)
Dept: HOSPITAL 18 - NAV ERS | Age: 46
Discharge: HOME | End: 2021-03-11
Payer: SELF-PAY

## 2021-03-11 DIAGNOSIS — S93.431A: Primary | ICD-10-CM

## 2021-03-11 DIAGNOSIS — Z79.899: ICD-10-CM

## 2021-03-11 DIAGNOSIS — E11.9: ICD-10-CM

## 2021-03-11 DIAGNOSIS — S83.91XA: ICD-10-CM

## 2021-03-11 DIAGNOSIS — I10: ICD-10-CM

## 2021-03-11 DIAGNOSIS — M32.9: ICD-10-CM
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
## 2021-03-11 DIAGNOSIS — Z79.4: ICD-10-CM
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
## 2021-03-14 ENCOUNTER — HOSPITAL ENCOUNTER (EMERGENCY)
Dept: HOSPITAL 18 - NAV ERS | Age: 46
Discharge: HOME | End: 2021-03-14
Payer: SELF-PAY

## 2021-03-14 DIAGNOSIS — S83.91XA: Primary | ICD-10-CM

## 2021-03-14 DIAGNOSIS — Z79.899: ICD-10-CM

## 2021-03-14 DIAGNOSIS — E11.9: ICD-10-CM

## 2021-03-14 DIAGNOSIS — Z79.4: ICD-10-CM

## 2021-03-14 DIAGNOSIS — I10: ICD-10-CM

## 2021-03-14 DIAGNOSIS — M32.9: ICD-10-CM
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
## 2021-03-14 PROCEDURE — 96372 THER/PROPH/DIAG INJ SC/IM: CPT

## 2021-03-14 PROCEDURE — 99283 EMERGENCY DEPT VISIT LOW MDM: CPT

## 2021-04-25 ENCOUNTER — HOSPITAL ENCOUNTER (EMERGENCY)
Dept: HOSPITAL 92 - ERS | Age: 46
Discharge: HOME | End: 2021-04-25
Payer: SELF-PAY

## 2021-04-25 DIAGNOSIS — E11.9: ICD-10-CM

## 2021-04-25 DIAGNOSIS — M35.9: ICD-10-CM

## 2021-04-25 DIAGNOSIS — M21.161: ICD-10-CM

## 2021-04-25 DIAGNOSIS — I10: ICD-10-CM

## 2021-04-25 DIAGNOSIS — M25.562: Primary | ICD-10-CM

## 2021-04-25 DIAGNOSIS — Z79.4: ICD-10-CM

## 2021-04-25 DIAGNOSIS — Z79.899: ICD-10-CM

## 2021-04-25 PROCEDURE — 96372 THER/PROPH/DIAG INJ SC/IM: CPT

## 2021-06-23 ENCOUNTER — HOSPITAL ENCOUNTER (EMERGENCY)
Dept: HOSPITAL 18 - NAV ERS | Age: 46
Discharge: HOME | End: 2021-06-23
Payer: SELF-PAY

## 2021-06-23 DIAGNOSIS — I10: ICD-10-CM

## 2021-06-23 DIAGNOSIS — E11.9: ICD-10-CM

## 2021-06-23 DIAGNOSIS — Z79.4: ICD-10-CM

## 2021-06-23 DIAGNOSIS — H00.015: Primary | ICD-10-CM

## 2021-06-23 DIAGNOSIS — Z79.899: ICD-10-CM

## 2021-06-23 PROCEDURE — 99283 EMERGENCY DEPT VISIT LOW MDM: CPT

## 2021-07-07 ENCOUNTER — HOSPITAL ENCOUNTER (EMERGENCY)
Dept: HOSPITAL 18 - NAV ERS | Age: 46
Discharge: HOME | End: 2021-07-07
Payer: SELF-PAY

## 2021-07-07 DIAGNOSIS — Z79.4: ICD-10-CM

## 2021-07-07 DIAGNOSIS — L02.215: Primary | ICD-10-CM

## 2021-07-07 DIAGNOSIS — E86.9: ICD-10-CM

## 2021-07-07 DIAGNOSIS — Z79.899: ICD-10-CM

## 2021-07-07 DIAGNOSIS — E11.65: ICD-10-CM

## 2021-07-07 DIAGNOSIS — I10: ICD-10-CM

## 2021-07-07 LAB
ALBUMIN SERPL BCG-MCNC: 3.6 G/DL (ref 3.5–5)
ALP SERPL-CCNC: 119 U/L (ref 40–110)
ALT SERPL W P-5'-P-CCNC: 16 U/L (ref 8–55)
ANION GAP SERPL CALC-SCNC: 16 MMOL/L (ref 10–20)
AST SERPL-CCNC: 10 U/L (ref 5–34)
BASOPHILS # BLD AUTO: 0.1 THOU/UL (ref 0–0.2)
BASOPHILS NFR BLD AUTO: 0.5 % (ref 0–1)
BILIRUB SERPL-MCNC: 0.4 MG/DL (ref 0.2–1.2)
BUN SERPL-MCNC: 15 MG/DL (ref 7–18.7)
CALCIUM SERPL-MCNC: 9.3 MG/DL (ref 7.8–10.44)
CHLORIDE SERPL-SCNC: 104 MMOL/L (ref 98–107)
CO2 SERPL-SCNC: 22 MMOL/L (ref 22–29)
CREAT CL PREDICTED SERPL C-G-VRATE: 0 ML/MIN (ref 70–130)
EOSINOPHIL # BLD AUTO: 0 THOU/UL (ref 0–0.7)
EOSINOPHIL NFR BLD AUTO: 0.3 % (ref 0–10)
GLOBULIN SER CALC-MCNC: 4.1 G/DL (ref 2.4–3.5)
GLUCOSE SERPL-MCNC: 391 MG/DL (ref 70–105)
GLUCOSE UR STRIP-MCNC: >=1000 MG/DL
HGB BLD-MCNC: 12.4 G/DL (ref 12–16)
LYMPHOCYTES # BLD AUTO: 1.4 THOU/UL (ref 1.2–3.4)
LYMPHOCYTES NFR BLD AUTO: 13.8 % (ref 21–51)
MCH RBC QN AUTO: 22.8 PG (ref 27–31)
MCV RBC AUTO: 76.3 FL (ref 78–98)
MONOCYTES # BLD AUTO: 0.2 THOU/UL (ref 0.11–0.59)
MONOCYTES NFR BLD AUTO: 2.4 % (ref 0–10)
NEUTROPHILS # BLD AUTO: 8.4 THOU/UL (ref 1.4–6.5)
NEUTROPHILS NFR BLD AUTO: 83 % (ref 42–75)
PLATELET # BLD AUTO: 229 THOU/UL (ref 130–400)
POTASSIUM SERPL-SCNC: 3.3 MMOL/L (ref 3.5–5.1)
RBC # BLD AUTO: 5.45 MILL/UL (ref 4.2–5.4)
SODIUM SERPL-SCNC: 139 MMOL/L (ref 136–145)
SP GR UR STRIP: 1.01 (ref 1–1.03)
WBC # BLD AUTO: 10.1 THOU/UL (ref 4.8–10.8)

## 2021-07-07 PROCEDURE — 87077 CULTURE AEROBIC IDENTIFY: CPT

## 2021-07-07 PROCEDURE — 87205 SMEAR GRAM STAIN: CPT

## 2021-07-07 PROCEDURE — 85025 COMPLETE CBC W/AUTO DIFF WBC: CPT

## 2021-07-07 PROCEDURE — 80053 COMPREHEN METABOLIC PANEL: CPT

## 2021-07-07 PROCEDURE — 56405 I&D VULVA/PERINEAL ABSCESS: CPT

## 2021-07-07 PROCEDURE — 87070 CULTURE OTHR SPECIMN AEROBIC: CPT

## 2021-07-07 PROCEDURE — 36416 COLLJ CAPILLARY BLOOD SPEC: CPT

## 2021-07-07 PROCEDURE — 87186 SC STD MICRODIL/AGAR DIL: CPT

## 2021-07-07 PROCEDURE — 81003 URINALYSIS AUTO W/O SCOPE: CPT

## 2022-09-09 ENCOUNTER — HOSPITAL ENCOUNTER (OUTPATIENT)
Dept: HOSPITAL 92 - CSHMAMMO | Age: 47
Discharge: HOME | End: 2022-09-09
Attending: FAMILY MEDICINE
Payer: COMMERCIAL

## 2022-09-09 DIAGNOSIS — Z12.31: Primary | ICD-10-CM

## 2022-09-09 PROCEDURE — 77067 SCR MAMMO BI INCL CAD: CPT

## 2022-09-09 PROCEDURE — 77063 BREAST TOMOSYNTHESIS BI: CPT

## 2023-02-09 ENCOUNTER — HOSPITAL ENCOUNTER (OUTPATIENT)
Dept: HOSPITAL 92 - TBSIIMAG | Age: 48
Discharge: HOME | End: 2023-02-09
Attending: EMERGENCY MEDICINE
Payer: COMMERCIAL

## 2023-02-09 DIAGNOSIS — M67.853: ICD-10-CM

## 2023-02-09 DIAGNOSIS — S83.511A: ICD-10-CM

## 2023-02-09 DIAGNOSIS — M67.863: ICD-10-CM

## 2023-02-09 DIAGNOSIS — R60.0: ICD-10-CM

## 2023-02-09 DIAGNOSIS — M23.91: ICD-10-CM

## 2023-02-09 DIAGNOSIS — M25.561: Primary | ICD-10-CM

## 2023-04-01 ENCOUNTER — HOSPITAL ENCOUNTER (EMERGENCY)
Dept: HOSPITAL 18 - NAV ERS | Age: 48
Discharge: HOME | End: 2023-04-01
Payer: COMMERCIAL

## 2023-04-01 DIAGNOSIS — Z20.822: ICD-10-CM

## 2023-04-01 DIAGNOSIS — R11.2: Primary | ICD-10-CM

## 2023-04-01 DIAGNOSIS — E11.9: ICD-10-CM

## 2023-04-01 DIAGNOSIS — R50.9: ICD-10-CM

## 2023-04-01 DIAGNOSIS — I11.0: ICD-10-CM

## 2023-04-01 DIAGNOSIS — Z79.4: ICD-10-CM

## 2023-04-01 DIAGNOSIS — Z79.899: ICD-10-CM

## 2023-04-01 DIAGNOSIS — I50.9: ICD-10-CM

## 2023-04-01 LAB
ALBUMIN SERPL BCG-MCNC: 4.1 G/DL (ref 3.5–5)
ALP SERPL-CCNC: 108 U/L (ref 40–110)
ALT SERPL W P-5'-P-CCNC: 15 U/L (ref 8–55)
ANION GAP SERPL CALC-SCNC: 15 MMOL/L (ref 10–20)
AST SERPL-CCNC: 16 U/L (ref 5–34)
BACTERIA UR QL AUTO: (no result) HPF
BASOPHILS # BLD AUTO: 0 THOU/UL (ref 0–0.2)
BASOPHILS NFR BLD AUTO: 0.8 % (ref 0–1)
BILIRUB SERPL-MCNC: 0.4 MG/DL (ref 0.2–1.2)
BUN SERPL-MCNC: 14 MG/DL (ref 7–18.7)
CALCIUM SERPL-MCNC: 9.2 MG/DL (ref 7.8–10.44)
CHLORIDE SERPL-SCNC: 106 MMOL/L (ref 98–107)
CO2 SERPL-SCNC: 24 MMOL/L (ref 22–29)
CREAT CL PREDICTED SERPL C-G-VRATE: 0 ML/MIN (ref 70–130)
EOSINOPHIL # BLD AUTO: 0.1 THOU/UL (ref 0–0.7)
EOSINOPHIL NFR BLD AUTO: 2.6 % (ref 0–10)
GLOBULIN SER CALC-MCNC: 3.8 G/DL (ref 2.4–3.5)
GLUCOSE SERPL-MCNC: 93 MG/DL (ref 70–105)
HGB BLD-MCNC: 11.1 G/DL (ref 12–16)
LIPASE SERPL-CCNC: 39 U/L (ref 8–78)
LYMPHOCYTES # BLD AUTO: 1.1 THOU/UL (ref 1.2–3.4)
LYMPHOCYTES NFR BLD AUTO: 22.4 % (ref 21–51)
MCH RBC QN AUTO: 23.2 PG (ref 27–31)
MCV RBC AUTO: 77.6 FL (ref 78–98)
MONOCYTES # BLD AUTO: 0.4 THOU/UL (ref 0.11–0.59)
MONOCYTES NFR BLD AUTO: 8.3 % (ref 0–10)
MUCOUS THREADS UR QL AUTO: (no result) LPF
NEUTROPHILS # BLD AUTO: 3.1 THOU/UL (ref 1.4–6.5)
NEUTROPHILS NFR BLD AUTO: 65.9 % (ref 42–75)
PLATELET # BLD AUTO: 160 10X3/UL (ref 130–400)
POTASSIUM SERPL-SCNC: 3.6 MMOL/L (ref 3.5–5.1)
PROT UR STRIP.AUTO-MCNC: 30 MG/DL
RBC # BLD AUTO: 4.77 MILL/UL (ref 4.2–5.4)
RBC UR QL AUTO: (no result) HPF (ref 0–3)
SODIUM SERPL-SCNC: 141 MMOL/L (ref 136–145)
SP GR UR STRIP: 1.03 (ref 1–1.04)
WBC # BLD AUTO: 4.8 10X3/UL (ref 4.8–10.8)
WBC UR QL AUTO: (no result) HPF (ref 0–3)

## 2023-04-01 PROCEDURE — U0005 INFEC AGEN DETEC AMPLI PROBE: HCPCS

## 2023-04-01 PROCEDURE — 96375 TX/PRO/DX INJ NEW DRUG ADDON: CPT

## 2023-04-01 PROCEDURE — 93005 ELECTROCARDIOGRAM TRACING: CPT

## 2023-04-01 PROCEDURE — 83690 ASSAY OF LIPASE: CPT

## 2023-04-01 PROCEDURE — 81015 MICROSCOPIC EXAM OF URINE: CPT

## 2023-04-01 PROCEDURE — 84484 ASSAY OF TROPONIN QUANT: CPT

## 2023-04-01 PROCEDURE — 71045 X-RAY EXAM CHEST 1 VIEW: CPT

## 2023-04-01 PROCEDURE — U0003 INFECTIOUS AGENT DETECTION BY NUCLEIC ACID (DNA OR RNA); SEVERE ACUTE RESPIRATORY SYNDROME CORONAVIRUS 2 (SARS-COV-2) (CORONAVIRUS DISEASE [COVID-19]), AMPLIFIED PROBE TECHNIQUE, MAKING USE OF HIGH THROUGHPUT TECHNOLOGIES AS DESCRIBED BY CMS-2020-01-R: HCPCS

## 2023-04-01 PROCEDURE — C9113 INJ PANTOPRAZOLE SODIUM, VIA: HCPCS

## 2023-04-01 PROCEDURE — 96374 THER/PROPH/DIAG INJ IV PUSH: CPT

## 2023-04-01 PROCEDURE — 83605 ASSAY OF LACTIC ACID: CPT

## 2023-04-01 PROCEDURE — 85025 COMPLETE CBC W/AUTO DIFF WBC: CPT

## 2023-04-01 PROCEDURE — 36415 COLL VENOUS BLD VENIPUNCTURE: CPT

## 2023-04-01 PROCEDURE — 87804 INFLUENZA ASSAY W/OPTIC: CPT

## 2023-04-01 PROCEDURE — 81003 URINALYSIS AUTO W/O SCOPE: CPT

## 2023-04-01 PROCEDURE — 80053 COMPREHEN METABOLIC PANEL: CPT

## 2023-11-20 ENCOUNTER — HOSPITAL ENCOUNTER (EMERGENCY)
Dept: HOSPITAL 18 - NAV ERS | Age: 48
Discharge: HOME | End: 2023-11-20
Payer: COMMERCIAL

## 2023-11-20 DIAGNOSIS — I11.0: ICD-10-CM

## 2023-11-20 DIAGNOSIS — Z79.4: ICD-10-CM

## 2023-11-20 DIAGNOSIS — I50.9: ICD-10-CM

## 2023-11-20 DIAGNOSIS — E11.9: ICD-10-CM
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
## 2023-11-20 DIAGNOSIS — S80.811A: Primary | ICD-10-CM

## 2023-11-20 DIAGNOSIS — Z79.899: ICD-10-CM

## 2023-11-20 DIAGNOSIS — G57.93: ICD-10-CM

## 2023-11-20 PROCEDURE — 99283 EMERGENCY DEPT VISIT LOW MDM: CPT

## 2024-06-27 ENCOUNTER — HOSPITAL ENCOUNTER (EMERGENCY)
Dept: HOSPITAL 18 - NAV ERS | Age: 49
Discharge: HOME | End: 2024-06-27
Payer: COMMERCIAL

## 2024-06-27 DIAGNOSIS — I50.9: ICD-10-CM

## 2024-06-27 DIAGNOSIS — Z79.899: ICD-10-CM

## 2024-06-27 DIAGNOSIS — I11.0: ICD-10-CM

## 2024-06-27 DIAGNOSIS — H60.92: ICD-10-CM

## 2024-06-27 DIAGNOSIS — E11.9: ICD-10-CM

## 2024-06-27 DIAGNOSIS — K02.9: ICD-10-CM

## 2024-06-27 DIAGNOSIS — H92.02: Primary | ICD-10-CM

## 2024-06-27 DIAGNOSIS — K04.7: ICD-10-CM

## 2024-06-27 PROCEDURE — 96372 THER/PROPH/DIAG INJ SC/IM: CPT

## 2024-06-27 PROCEDURE — 99283 EMERGENCY DEPT VISIT LOW MDM: CPT

## 2024-09-17 ENCOUNTER — HOSPITAL ENCOUNTER (EMERGENCY)
Dept: HOSPITAL 18 - NAV ERS | Age: 49
Discharge: HOME | End: 2024-09-17
Payer: COMMERCIAL

## 2024-09-17 DIAGNOSIS — I11.0: ICD-10-CM

## 2024-09-17 DIAGNOSIS — E11.9: ICD-10-CM

## 2024-09-17 DIAGNOSIS — Z79.899: ICD-10-CM

## 2024-09-17 DIAGNOSIS — I50.9: ICD-10-CM

## 2024-09-17 DIAGNOSIS — J06.9: Primary | ICD-10-CM

## 2024-09-17 PROCEDURE — 99283 EMERGENCY DEPT VISIT LOW MDM: CPT

## 2024-09-17 PROCEDURE — U0002 COVID-19 LAB TEST NON-CDC: HCPCS

## 2024-09-17 PROCEDURE — 87804 INFLUENZA ASSAY W/OPTIC: CPT

## 2024-09-18 ENCOUNTER — HOSPITAL ENCOUNTER (EMERGENCY)
Dept: HOSPITAL 18 - NAV ERS | Age: 49
Discharge: HOME | End: 2024-09-18
Payer: COMMERCIAL

## 2024-09-18 DIAGNOSIS — J06.9: Primary | ICD-10-CM

## 2024-09-18 DIAGNOSIS — I11.0: ICD-10-CM

## 2024-09-18 DIAGNOSIS — E11.9: ICD-10-CM

## 2024-09-18 DIAGNOSIS — I50.9: ICD-10-CM

## 2024-09-18 PROCEDURE — 71046 X-RAY EXAM CHEST 2 VIEWS: CPT

## 2024-09-18 PROCEDURE — 93005 ELECTROCARDIOGRAM TRACING: CPT

## 2025-02-19 NOTE — RAD
PORTABLE AP CHEST X-RAY:

9/26/17

 

HISTORY: 

Dyspnea. Patient feels as if there is a knot in mid sternal chest region. Difficulty breathing when 
lying down or walking. 

 

COMPARISON:  

9/25/16.

 

FINDINGS:  

The cardiac silhouette and pulmonary vasculature are within normal limits. The lungs remain clear. T
here has been no interval change from the prior exam.

 

IMPRESSION:  

No acute cardiopulmonary process. 

 

POS: [facility] PSYCH FOLLOW UP    THIS VISIT WAS COMPLETED VIA TELEHEALTH           Patient:  Fara Hernandez  MRN:  [id number]  :  1979  DATE OF SERVICE:  2025  START TIME: 1:00 PM   END TIME: 1:17 AM     Patient was identified by name: Fara Hernandez   The patient was informed that consent to treat includes permission to submit charges to the applicable insurance on file. The patient was advised regarding the potential risk inherent in video visits, as the assessment may be limited due to what can be seen on the screen which potentially results in an incomplete assessment; as well as either of us may discontinue the video visit if it is felt that the videoconferencing connections are not adequate for her situation.   Clinician Location: [address] [place] [address], [address], [place], [address]  Patient Location: Work in [place]         Medications:  Current Outpatient Medications   Medication Sig Dispense Refill    venlafaxine XR (EFFEXOR XR) 75 MG 24 hr capsule Take 1 capsule by mouth daily. 30 capsule 2    ondansetron (ZOFRAN ODT) 4 MG disintegrating tablet Place 1 tablet onto the tongue every 8 hours as needed for Nausea. 30 tablet 0    semaglutide-Weight Management (Wegovy) 1.7 MG/0.75ML injection Inject 1.7 mg into the skin every 7 days. Indications: OBESITY 2 mL 0    hydrOXYzine (ATARAX) 25 MG tablet Take 1 tablet by mouth every 8 hours as needed for Anxiety. Panic attacks and or sleep 60 tablet 1    blood glucose (OneTouch Ultra) test strip Test blood sugar 1 times daily. 100 strip 3    blood glucose meter Test blood sugar once times daily. Dispense whatever brand is covered with lancets and strips 1 kit 0    fluconazole (DIFLUCAN) 150 MG tablet Take 1 tablet by mouth 1 time for 1 dose. Repeat dose in 48 hours if symptoms persist. 2 tablet 0    ammonium lactate (AMLACTIN) 12 % lotion APPLY TO THE AFFECTED AREA ON ARMS AND LEGS DAILY AFTER BATHING FOR 4 WEEKS AS NEEDED.      triamcinolone (ARISTOCORT)  0.1 % cream APPLY SMALL AMOUNT TOPICALLY TO THE AFFECTED AREA TWICE DAILY AS NEEDED      Fremanezumab-vfrm (Ajovy) 225 MG/1.5ML Solution Prefilled Syringe Inject 1.5 mLs into the skin every 30 days. 4.5 mL 3    amitriptyline (ELAVIL) 10 MG tablet Take 1 tablet by mouth nightly. 90 tablet 1    montelukast (SINGULAIR) 10 MG tablet Take 1 tablet by mouth every evening. 90 tablet 1    albuterol 108 (90 Base) MCG/ACT inhaler Inhale 2 puffs into the lungs every 4 hours as needed for Shortness of Breath, Wheezing or Other (cough). 1 each 3    Xiidra 5 % Solution INSTILL 1 DROP TWICE DAILY BOTH EYES AS NEEDED      Ubrogepant (Ubrelvy) 100 MG Tab Take 1 tablet by mouth 2 times daily as needed for migraine. 24 tablet 3    ibuprofen (MOTRIN) 200 MG tablet Take 800 mg by mouth 2 times daily as needed for Pain.      Calcium Carb-Cholecalciferol (Calcium-Vitamin D) 600-400 MG-UNIT Tab Take 1 tablet by mouth 2 times daily. (Patient taking differently: Take 1 tablet by mouth every morning.) 180 tablet 1    Multiple Vitamin (Multi Vitamin Daily) Tab Take 1 tablet by mouth daily. 90 tablet 1    cetirizine (ZyrTEC Allergy) 10 MG tablet Take 1 tablet by mouth daily. (Patient taking differently: Take 10 mg by mouth every morning.) 30 tablet 5     No current facility-administered medications for this visit.      Allergies:  ALLERGIES:  No Known Allergies      Labs:  Lab Results   Component Value Date    SODIUM 138 04/17/2024    POTASSIUM 4.5 04/17/2024    CHLORIDE 107 04/17/2024    CO2 27 04/17/2024    GLUCOSE 98 04/17/2024    BUN 15 04/17/2024    CREATININE 0.86 04/17/2024    ALBUMIN 3.9 04/17/2024    BILIRUBIN 0.4 04/17/2024    LACTA 0.8 11/23/2021    AST 24 04/17/2024    INR 1.1 10/23/2017    WBC 4.6 04/17/2024    RBC 3.65 (L) 04/17/2024    HGB 11.6 (L) 04/17/2024    HCT 35.1 (L) 04/17/2024     04/17/2024    PT 11.9 (H) 10/23/2017    PTT 31 (H) 10/23/2017       Vitals:  There were no vitals filed for this visit.-virtually  conducted     Patient's last menstrual period: Hx of hysterectomy     Visit vitals obtained from previous encounter 7/25/2024        BP Readings from Last 1 Encounters:   07/25/24 136/78            Pulse Readings from Last 1 Encounters:   07/25/24 82            Resp Readings from Last 1 Encounters:   06/24/24 16            Wt Readings from Last 1 Encounters:   07/25/24 98.1 kg (216 lb 4.3 oz)            Ht Readings from Last 1 Encounters:   07/25/24 5' 6\" (1.676 m)            BMI Readings from Last 1 Encounters:   07/25/24 34.91 kg/m²                                                                                                                                                 Chief Complaint:  \"I feel still feel pretty good\"      ID:  Fara Hernandez is a 45 year old female, with past medical history of seasonal allergies, celiac disease, obesity, migraines, chronic back pain, osteopenia and vitamin D deficiency, and past psychiatric history of depression, anxiety and insomnia seen today for follow up and medication management for depression and anxiety.     Interval history:  A/P last appointment (12/09/2025)    Major Depressive Disorder -Minimal   -Continue Venlafaxine (Effexor) 75 mg oral daily;Indication: MDD, ERNESTINE and panic disorder   -Patient also taking Elavil for chronic cough; discussed the risk of serotonin syndrome w/ multiple serotonergic agents/psychotropics -patient verbalized understanding  -Start Psychotherapy to help challenge negative thoughts,work through feeling and emotions     2. Generalized Anxiety Disorder -Minimal   *Trauma   -Continue Venlafaxine (Effexor) 75 mg oral daily;Indication: MDD, ERNESTINE and panic disorder   -Continue Hydroxyzine 25 mg oral three daily as needed for breakthrough anxiety, panic attacks and or sleep  -Start Psychotherapy to help learn positive coping skills to manage stress, implement relaxation and mindfulness techniques to decrease anxiety      3. Insomnia  -Continue  Hydroxyzine 25 mg oral three daily as needed for breakthrough anxiety, panic attacks and or sleep  -Good sleep hygiene encouraged      4. Medication management  -Recommend exercising regularly to assist with feeling better and to improve your overall performance  -Healthy wholesome diet recommended      Since last appointment,     Subjective:    Currently, the patient is taking the medications as prescribed and denies side effects.     Patient states, \"I feel still feel pretty good.\" She reports last time we talked about how I get really bad anxiety when flying, so during my most recent flight I took Hydroxyzine (25 mg), but it didn't help, and I was really embarrassed. She notes in the past I was prescribed Xanax, and I would take it prior to flying. She reports I plan to possibly fly again in June, which I have concerns about, so I have been minimizing watching the news due to the recent events surrounding flying. She adamantly states aside from that I feel good. She voices it was my first time being on a cruise, and we had a good time. She denies feeling sad, down and depressed. She endorses fair energy. She voices when I am not working, and I have the day off my energy is ok. However, she notes feeling \"mentally exhausted\" after work. She denies feeling hopeless or helpless. She reports sleep is fine. She reports sleeping 7-8 hours per night. She reports my appetite has been ok. She states I was on Wegovy and I lost 16 lbs., but I had to stop taking it due to diarrhea. She states I haven't made it to the gym. She denies having any acute stressor in her life at this time.      In regards to Anxiety,    She reports beside the anxiety that I experienced while flying, my anxiety has been manageable. She reports my family has been talking about going to [place], which I am happy about. She states so I don't believe my fear of flying stops me from what to go places, but it has stopped me from considering longer flights  like overseas. She denies feeling nervous regularly. She voices I have minimal stressors at this time, and I can deal with them. She endorses slight irritability. She states for example, at the end of a workday I need my me time. She denies panic attack. She denies frequent use or needing Hydroxyzine regularly.     Denies suicidal ideations, passive suicidal ideations, intent and plan. Denies homicidal ideations, intent and plan.      She endorses alcohol use occasionally. She denies illicit drug and nicotine/tobacco use.    Last four PHQ 2/9 Test Results          2/19/2025     7:20 AM 12/9/2024     6:40 AM 11/8/2024    12:23 PM 9/26/2024     8:14 AM   PHQ 2 Score   Adult PHQ 2 Score 0 0 1 2   Adult PHQ 2 Interpretation No further screening needed No further screening needed No further screening needed No further screening needed   Little interest or pleasure in activity? 0 0 1 1         2/19/2025     7:20 AM 12/9/2024     6:40 AM 11/8/2024    12:23 PM 9/26/2024     8:14 AM   PHQ 9 Score   Adult PHQ 9 Score 0 1 3 5   Adult PHQ 9 Interpretation  Minimal Depression Minimal Depression Mild Depression     In regards to depression, the patient denies feeling sad, depressed daily or most days.  The pt denies feeling helpless and hopeless.  Recent stressors include: Denies . The pt denies problems with sleep; reports sleeping 7-8 hrs/night.  The pt denies problems w/ energy/motivation. The pt  denies problems with appetite; denies unintentional weight change.   The pt denies symptoms of anhedonia.  The pt denies brain fog, problems with focus/concentration.  The pt denies suicidal ideation, thoughts of self harm, homicidal ideation.      Last four GAD7 Assessments           2/19/2025     1:00 PM 1/30/2025    10:00 AM 12/9/2024     1:00 PM 11/8/2024     1:00 PM   GAD7 Screening   GAD7 Score 1  3 2   Feeling nervous, anxious or on edge Not at all  Several days Not at all   Not being able to stop or control worrying Not at  all  Not at all Not at all   Worrying too much about different things Not at all  Several days Several days   Trouble relaxing Not at all  Not at all Not at all   Being so restless that it's hard to sit still Not at all  Not at all Not at all   Becoming easily annoyed or irritable Several days  Several days Several days   Feeling afraid as if something awful might happen Not at all  Not at all Not at all   Ability to handle work, home and other people Not difficult at all  Not difficult at all Not difficult at all   Date/time completed by patient via Convo 2/19/2025  7:19 AM    2/19/2025  7:19 AM    2/19/2025  7:20 AM 1/28/2025  8:15 AM 12/9/2024  6:39 AM    12/9/2024  6:39 AM    12/9/2024  6:40 AM 11/8/2024 12:22 PM    11/8/2024 12:22 PM    11/8/2024 12:23 PM     In regards to anxiety, the patient denies excessive worry, difficulty controlling worry.   The patient denies anticipatory worry.   The patient denies ruminating thoughts. The patient denies feeling nervous regularly.  The patient denies feeling overwhelmed regularly.  The patient denies catastrophic thinking.  The patient endorses irritability.   The patient denies anxiety that is interfering with sleep.   The patient denies panic attacks.     [place] Suicide Severity Rating Scale Completed(NEGATIVE SCREEN)-WHITE   1. Have you wished you were dead or wished you could go to sleep and not wake up? (past month): No (02/19/25 1351)  2. Have you actually had any thoughts of killing yourself? (past month): No (02/19/25 1351)  6. Have you ever done anything, started to do anything, or prepared to do anything to end your life? (lifetime): No (02/19/25 1351)  Suicide Evaluation: Negative Screen - White (02/19/25 1351)      Reviewed the above questionnaires with patient. No passive/active suicidal ideations within the past 2 weeks     Past psychiatric history/meds:   Past Psychiatric History:  Past psychiatric diagnoses: Anxiety, Depression and Insomnia    Outpatient treatment: []No [x]Yes  H/o psychiatric hospitalizations: Denies   H/o IOP/PHP: Denies   Past suicidal ideation: []No [x]Yes 4 years ago during COVID.She states I sought out treatment during this time from my previous provider  H/o suicide attempts: Denies  H/o self harm: Denies   History of eating disorder:Denies  [x]No []Yes  H/o manic episodes: Denies   H/o psychotic symptoms: Denies  Previous psychotropic medications:Sertraline (she states I feel I had the best respond to it, but not sure why I switched), Duloxetine (She reports I don't recall taking this), Xanax (was using more than I should), Lexapro (weight gain-trailed many years ago),Trazodone (self discontinued, felt it was not needed), Wellbutrin (self-discontinued, ineffective),Hydroxyzine (current), Effexor (current)  Previous ECT/TMS:Denies   H/o esketamine/Ketamine: Denies   Therapist: Previous psychotherapy with Dr. Christiansen and Dr. Francie Martin. Denies currently being in therapy   Previous psychiatric provider: Gina Molina PA-C     Social History     Socioeconomic History    Marital status:    Occupational History    Occupation: RN [employer]   Tobacco Use    Smoking status: Never    Smokeless tobacco: Never   Vaping Use    Vaping status: never used   Substance and Sexual Activity    Alcohol use: Yes     Comment: 2 days per week, very rare 1-2 drinks per week    Drug use: Never    Sexual activity: Yes   Social History Narrative    Has two children. Not present in home.     Social Determinants of Health     Food Insecurity: Low Risk  (1/28/2025)    Food Insecurity     Worried about Food: Never true     Food is Gone: Never true   Transportation Needs: Not At Risk (1/28/2025)    Transportation Needs     Lack of Reliable Transportation: No   Interpersonal Safety: Low Risk  (6/4/2024)    Interpersonal Safety     How often threatened with harm: Never            Mental Status Exam:     Appearance: appears stated age,  NAD  Behavior/Attitude: Cooperative and Adequate Eye Contact  Motor: No PMA/PMR noted via Zoom  Speech: Spontaneous, normal rate, rhythm, volume, and talkative  Language: Appropriate for age, situation  Mood- \"I feel still feel pretty good\"   Affect: Euthymic, congruent with mood, and non-labile  Thought Process: linear, logical, and goal-directed  Associations: Intact  Thought Content: Denies suicidal ideation, no psychosis elicited, and Denies SI, HI  Perceptions: no evidence of response to internal stimuli and denies AVH  Insight: age appropriate and good  Judgement: good and compliant with treatment  General Cognition: Awake, Alert, and Fully Oriented and memory grossly intact         Assessment:  Fara Hernandez is a 45-year-old female with PMHx of seasonal allergies, celiac disease, obesity, migraines, chronic back pain, osteopenia and vitamin D deficiency, and past psychiatric history of depression, anxiety and insomnia contacted for follow up and medication management for depression and anxiety. The patient endorses occasional alcohol use. The patient denies illicit drug and nicotine/tobacco use that can potentially negatively impaction mood, anxiety and concentration/focus. Biologically, the patient reports family history of sister with anxiety, mother with anxiety and depression and father with alcohol abuse. Psychologically, the patient endorses minimal anxiety and denies depressive symptoms. The patient denies active & passive SI, intent and plan. She denies recent panic attacks. The patient reports significant improvements in depressive and anxiety symptoms with Venlafaxine and expressed her desire to continue her current medication regimen and deferred increasing at this time.Therefore, the patient was advised to continue Venlafaxine (Effexor) XR 75 mg oral daily; Indication: MDD, ERNESTINE and panic disorder. Patient amendable. The patient is also taking Amitriptyline (Elavil) 10 mg for chronic cough;  discussed the risk of serotonin syndrome w/ multiple serotonergic agents/psychotropics. Patient verbalized understanding. The patient denies frequent and or regularly needing Hydroxyzine. The patient notes that Hydroxyzine is ineffective for flight anxiety. Discussed possible alternative medications for flight anxiety, will further explore during future visits. The patient was advised to continue Hydroxyzine 25 mg oral three times daily as needed for breakthrough anxiety, panic attacks and or sleep.  Patient amendable. The patient reports sleep has improved and denies current need for medication to assist with sleeping. Patient encouraged to continue to incorporate a good sleep hygiene. Patient amendable. Psychotherapy recommended and referrals previously provided. Socially, the patient lives at home with her partner and is employed as an OB/GYN nurse for [employer]. The patient had identified as her partner, mom, family and some close friends as her support system. Safety concerns are minimal. Pt denies SI, HI, AVH, and is not perceived to be an imminent threat to self or others.       PHQ-9 score =0,  indicating No depressive symptoms; ERNESTINE-7 score =1, indicating Minimal anxiety symptoms         According to the [place]-SSRS, this patient is at  risk for suicide. Risk interventions include: Patient already connected to  services, including psychotherapist. Follow up plan as indicated below.    Diagnosis & Plan:  Major Depressive Disorder  -Continue Venlafaxine (Effexor) 75 mg oral daily;Indication: MDD, ERNESTINE and panic disorder   -Patient also taking Elavil for chronic cough; discussed the risk of serotonin syndrome w/ multiple serotonergic agents/psychotropics -patient verbalized understanding  -Start Psychotherapy to help challenge negative thoughts,work through feeling and emotions     2. Generalized Anxiety Disorder -Minimal   *Trauma   -Continue Venlafaxine (Effexor) 75 mg oral daily;Indication: MDD, ERNESTINE and panic disorder    -Continue Hydroxyzine 25 mg oral three daily as needed for breakthrough anxiety, panic attacks and or sleep  -The patient notes that Hydroxyzine is ineffective for flight anxiety  -Discussed possible alternative medications for flight anxiety, will further explore during future visits   -Start Psychotherapy to help learn positive coping skills to manage stress, implement relaxation and mindfulness techniques to decrease anxiety      3. Insomnia  -Continue Hydroxyzine 25 mg oral three daily as needed for breakthrough anxiety, panic attacks and or sleep  -Good sleep hygiene encouraged      4. Medication management  -Recommend exercising regularly to assist with feeling better and to improve your overall performance  -Healthy wholesome diet recommended       Here are a list of psychotherapist for you to consider:      1.Carly Rosales  [place] [address]  [phone]     2.Kelly Mercado  [place], [place] [id number] [phone]     3.Hailee Hilario  [facility]  [address]  [address]  [place] [address]   [phone]     4.Earlene Abad  [address]  [address]  [place], [place] [id number] [phone]     5. Noemi Baker   Call the office at  [phone] and make an appointment        Follow up  Return in about 3 months (around 5/19/2025), or if symptoms worsen or fail to improve, for anxiety, depression, insomnia.    Client understand to call 911 or go to closest ER for any SI/HI/ self harm.     Total time spent with patient 17 minutes.  Total time spent on care: 30 minutes. This includes pre-charting, chart review, and documenting.   LOS based on MDM    Discussed the benefits/risks/alternatives of treatment plan. Discussed common and concerning side effects and the patient has demonstrated their understanding of these side effects by engaging in meaningful conversation and asking appropriate questions about them. The patient is competent to make decisions about their  healthcare.  Patient provided verbal informed consent for medications.   The patient knows how to contact provider and family members, as well as go to the ED or call 911 in case of an emergency, including thoughts of self-harm, suicidal/homicidal ideation, or severe worsening of symptoms. Or seek emergent psychiatric evaluation given occurrence of new or worsening sxs, including safety concerns such as danger to self, others, significant medication SE's or inability to self care.       Isidro Ruth DNP, APRN-FPA, FNP-BC, PMHNP-BC    Collaborating MD: Breanna Bishop  This information is confidential and disclosure without patient consent or statutory authorization is prohibited by law.